# Patient Record
Sex: FEMALE | Race: WHITE
[De-identification: names, ages, dates, MRNs, and addresses within clinical notes are randomized per-mention and may not be internally consistent; named-entity substitution may affect disease eponyms.]

---

## 2018-03-29 ENCOUNTER — HOSPITAL ENCOUNTER (OUTPATIENT)
Dept: HOSPITAL 62 - WI | Age: 73
End: 2018-03-29
Attending: NURSE PRACTITIONER
Payer: MEDICARE

## 2018-03-29 DIAGNOSIS — Z12.31: Primary | ICD-10-CM

## 2018-03-29 PROCEDURE — 77063 BREAST TOMOSYNTHESIS BI: CPT

## 2018-03-29 PROCEDURE — 77067 SCR MAMMO BI INCL CAD: CPT

## 2018-04-03 NOTE — WOMENS IMAGING REPORT
EXAM DESCRIPTION:  3D SCREENING MAMMO BILAT



COMPLETED DATE/TIME:  3/29/2018 1:54 pm



REASON FOR STUDY:  ENCNTR SCREEN MAMMOGRAM FOR MALIGNANT NEOPLASM OF BREAST Z12.31  ENCNTR SCREEN AMPARO
MOGRAM FOR MALIGNANT NEOPLASM OF LEBRON



COMPARISON:  8/9/2016



TECHNIQUE:  Standard craniocaudal and mediolateral oblique views of each breast recorded using digita
l acquisition and breast tomosynthesis.



LIMITATIONS:  None.



FINDINGS:  Findings present which are benign by mammographic criteria.  No suspicious masses, calcifi
cations or architectural distortion.

Pertinent benign findings: Stable benign bilateral breast parenchymal calcifications and small parenc
hymal nodules.

Read with the assistance of CAD.

.[facility] - R2 Cenova Version 1.3

.[facility] - R2 Cenova Version 1.3

.[facility] - R2 Cenova Version 2.4

.[facility] [place] [facility] - R2 Cenova Version 2.4

.[facility] - R2  Version 9.2

Benign mammographic findings may include one or more of the following:  Smooth masses, popcorn/rim/co
arse calcifications, asymmetries, post-procedure changes, and lesions with long-standing stability.



IMPRESSION:  BENIGN MAMMOGRAPHIC FINDINGS.  BIRADS 2



BREAST DENSITY:  b. There are scattered areas of fibroglandular density.



BIRAD:  2 BENIGN FINDING(S)



RECOMMENDATION:  RECOMMENDATION: ROUTINE SCREENING

Please continue yearly bilateral screening tomosynthesis in April 2019



COMMENT:  The patient has been notified of the results by letter per SA requirements. Additional no
tification policies are in place for contacting patient with suspicious or incomplete findings.

Quality ID #[id number]: The American College of Radiology recommends an annual screening mammogram for women
 aged 40 years or over. This facility utilizes a reminder system to ensure that all patients receive 
reminder letters, and/or direct phone calls for appointments. This includes reminders for routine scr
eening mammograms, diagnostic mammograms, or other Breast Imaging Interventions when appropriate.  Th
is patient will be placed in the appropriate reminder system.

The American College of Radiology (ACR) has developed recommendations for screening MRI of the breast
s in certain patient populations, to be used in conjunction with mammography.  Breast MRI surveillanc
e may be appropriate for women with more than 20% lifetime risk of developing breast cancer  as deter
mined by genetic testing, significant family history of the disease, or history of mantle radiation f
or Hodgkins Disease.  ACR Practice Guidelines 2008.

[employer]



PQRS 6045F: Fluoroscopic imaging is not utilized for breast tomosynthesis.



TECHNICAL DOCUMENTATION:  FINDING NUMBER: (1)

ASSESSMENT: (1)

JOB ID:  [id number]

 2011 [employer]- All Rights Reserved



Reading location - IP/workstation name: [email or web address]

## 2018-12-15 ENCOUNTER — HOSPITAL ENCOUNTER (EMERGENCY)
Dept: HOSPITAL 62 - ER | Age: 73
Discharge: HOME | End: 2018-12-15
Payer: MEDICARE

## 2018-12-15 VITALS — SYSTOLIC BLOOD PRESSURE: 146 MMHG | DIASTOLIC BLOOD PRESSURE: 96 MMHG

## 2018-12-15 DIAGNOSIS — M54.9: ICD-10-CM

## 2018-12-15 DIAGNOSIS — F17.200: ICD-10-CM

## 2018-12-15 DIAGNOSIS — N30.00: ICD-10-CM

## 2018-12-15 DIAGNOSIS — B02.9: Primary | ICD-10-CM

## 2018-12-15 DIAGNOSIS — I25.2: ICD-10-CM

## 2018-12-15 DIAGNOSIS — R10.9: ICD-10-CM

## 2018-12-15 LAB
APPEARANCE UR: (no result)
APTT PPP: YELLOW S
BILIRUB UR QL STRIP: NEGATIVE
GLUCOSE UR STRIP-MCNC: NEGATIVE MG/DL
KETONES UR STRIP-MCNC: NEGATIVE MG/DL
NITRITE UR QL STRIP: NEGATIVE
PH UR STRIP: 6 [PH] (ref 5–9)
PROT UR STRIP-MCNC: NEGATIVE MG/DL
SP GR UR STRIP: 1.02
UROBILINOGEN UR-MCNC: 2 MG/DL (ref ?–2)

## 2018-12-15 PROCEDURE — 76380 CAT SCAN FOLLOW-UP STUDY: CPT

## 2018-12-15 PROCEDURE — 81001 URINALYSIS AUTO W/SCOPE: CPT

## 2018-12-15 PROCEDURE — 99284 EMERGENCY DEPT VISIT MOD MDM: CPT

## 2018-12-15 NOTE — ER DOCUMENT REPORT
ED Medical Screen (RME)





- General


Chief Complaint: Flank Pain


Stated Complaint: BACK PAIN


Time Seen by Provider: 12/15/18 09:43


Mode of Arrival: Ambulatory


Information source: Patient


TRAVEL OUTSIDE OF THE [place] IN LAST 30 DAYS: No





- HPI


Patient complains to provider of: R flank/back pain


Onset: Other - Pt. with 4 day h/o R flank/lbp with exacerbation this am





- Related Data


Allergies/Adverse Reactions: 


 





erythromycin base Allergy (Verified 12/15/18 09:11)


 STOMACH PAIN


Sulfa (Sulfonamide Antibiotics) Allergy (Verified 12/15/18 09:11)


 











Past Medical History





- Past Medical History


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 

STRESS PER PT


   Denies: Hx Coronary Artery Disease, Hx Hypertension


Pulmonary Medical History: Reports: Hx Pneumonia - YRS AGO


   Denies: Hx Asthma, Hx Bronchitis, Hx COPD


Neurological Medical History: Denies: Hx Cerebrovascular Accident, Hx Seizures


Musculoskeltal Medical History: Denies Hx Arthritis





- Immunizations


Hx Diphtheria, Pertussis, Tetanus Vaccination: Yes





Physical Exam





- Vital signs


Vitals: 





 











Temp Pulse Resp BP Pulse Ox


 


 98.7 F   83   18   135/79 H  99 


 


 12/15/18 09:19  12/15/18 09:19  12/15/18 09:19  12/15/18 09:19  12/15/18 09:19














Course





- Vital Signs


Vital signs: 





 











Temp Pulse Resp BP Pulse Ox


 


 98.7 F   83   18   135/79 H  99 


 


 12/15/18 09:19  12/15/18 09:19  12/15/18 09:19  12/15/18 09:19  12/15/18 09:19














Doctor's Discharge





- Discharge


Referrals: 


JOSE ROWLAND NP [Primary Care Provider] - Follow up as needed

## 2018-12-15 NOTE — ER DOCUMENT REPORT
ED General





- General


Chief Complaint: Flank Pain


Stated Complaint: BACK PAIN


Time Seen by Provider: 12/15/18 09:43


Mode of Arrival: Ambulatory


Notes: 





73-year-old female patient emergency department chief complaint of right flank 

pain.  Patient states pain is been present for about 3 days.  Radiating from 

the back around to the front.  No fever or chills.  Generally does not feel 

well though.  Noticed this morning that she had a rash on the back and then on 

the right front chest but thought it was related to the heating pad she has 

been wearing.  Has not put any lotions or creams on her back or chest.


TRAVEL OUTSIDE OF THE [place] IN LAST 30 DAYS: No





- HPI


Onset: Other - 3 days ago


Onset/Duration: Gradual, Constant, Worse


Quality of pain: Achy


Severity: Moderate


Pain Level: 3


Associated symptoms: None





- Related Data


Allergies/Adverse Reactions: 


 





erythromycin base Allergy (Verified 12/15/18 09:11)


 STOMACH PAIN


Sulfa (Sulfonamide Antibiotics) Allergy (Verified 12/15/18 09:11)


 











Past Medical History





- General


Information source: Patient





- Social History


Smoking Status: Current Every Day Smoker


Chew tobacco use (# tins/day): No


Frequency of alcohol use: None


Drug Abuse: None


Family History: Reviewed & Not Pertinent


Patient has suicidal ideation: No


Patient has homicidal ideation: No





- Past Medical History


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 

STRESS PER PT


   Denies: Hx Coronary Artery Disease, Hx Hypertension


Pulmonary Medical History: Reports: Hx Pneumonia - YRS AGO


   Denies: Hx Asthma, Hx Bronchitis, Hx COPD


Neurological Medical History: Denies: Hx Cerebrovascular Accident, Hx Seizures


Renal/ Medical History: Denies: Hx Peritoneal Dialysis


Musculoskeletal Medical History: Denies Hx Arthritis





- Immunizations


Hx Diphtheria, Pertussis, Tetanus Vaccination: Yes


Hx Pneumococcal Vaccination: 01/01/17





Review of Systems





- Review of Systems


Notes: 





Constitutional: denies: Chills, Diaphoresis, Fever, Malaise, Weakness





EENT: denies: Eye discharge, Blurred vision, Tearing, Double vision, Nose 

congestion, Nose discharge, Throat swelling, Mouth pain





Cardiovascular: denies: Palpitations, Heart racing, Orthopnea, Dyspnea, Chest 

pain





Respiratory: denies: Cough, Hurts to breathe, Wheezing, Shortness of breath





Gastrointestinal: denies: Abdominal pain, Diarrhea, Nausea, Vomiting, Black 

stools, bright red blood in stool





Genitourinary: denies: Burning, Dysuria, Discharge, Frequency, Flank pain, 

Hematuria





Musculoskeletal:  denies: Joint pain, Joint swelling, Muscle pain, Muscle 

stiffness,.  Positive for right-sided back pain





Hematologic/Lymphatic:  denies: Anemia, Easy bleeding, Easy bruising, Blood 

clots





Neurological/Psychological: denies: Confusion, Dementia, Depression, Loss of 

consciousness





Skin: No lesions, no masses, no skin breakdown, no abscesses





Physical Exam





- Vital signs


Vitals: 


 











Temp Pulse Resp BP Pulse Ox


 


 98.7 F   83   18   135/79 H  99 


 


 12/15/18 09:19  12/15/18 09:19  12/15/18 09:19  12/15/18 09:19  12/15/18 09:19











Interpretation: Normal





- General


General appearance: Appears well, Alert





- HEENT


Head: Normocephalic, Atraumatic


Eyes: Normal


Pupils: PERRL





- Respiratory


Respiratory status: No respiratory distress


Chest status: Nontender


Breath sounds: Normal


Chest palpation: Normal





- Cardiovascular


Rhythm: Regular


Heart sounds: Normal auscultation


Murmur: No





- Abdominal


Inspection: Normal


Distension: No distension


Bowel sounds: Normal


Tenderness: Nontender


Organomegaly: No organomegaly





- Back


Back: Normal, Nontender





- Extremities


General upper extremity: Normal inspection, Nontender, Normal color, Normal ROM

, Normal temperature


General lower extremity: Normal inspection, Nontender, Normal color, Normal ROM

, Normal temperature, Normal weight bearing.  No: Austin's sign





- Neurological


Neuro grossly intact: Yes


Cognition: Normal


Orientation: AAOx4


Horacio Coma Scale Eye Opening: Spontaneous


[place] Coma Scale Verbal: Oriented


Horacio Coma Scale Motor: Obeys Commands


Horacio Coma Scale Total: 15


Speech: Normal


Motor strength normal: LUE, RUE, LLE, RLE


Sensory: Normal





- Psychological


Associated symptoms: Normal affect, Normal mood





- Skin


Skin Temperature: Warm


Skin Moisture: Dry


Skin Color: Other - There are multiple areas that appear to be red and acute 

early vesicular lesions consistent with what you would see with shingles.





Course





- Re-evaluation


Re-evalutation: 





12/15/18 11:44


CT scan was ordered from the front and performed prior to me evaluating her.  

My evaluation is consistent with shingles.  Will check her urine as well.  I 

have given her pain medication.  Likely will start her on some antivirals.


12/15/18 11:45





 





Limited or Localized CT  12/15/18 09:43


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  No urinary 

tract stones identified.


 











12/15/18 12:40





Laboratory











  12/15/18





  10:50


 


Urine Color  YELLOW


 


Urine Appearance  SLIGHTLY-CLOUDY


 


Urine pH  6.0


 


Ur Specific Gravity  1.017


 


Urine Protein  NEGATIVE


 


Urine Glucose (UA)  NEGATIVE


 


Urine Ketones  NEGATIVE


 


Urine Blood  NEGATIVE


 


Urine Nitrite  NEGATIVE


 


Urine Bilirubin  NEGATIVE


 


Urine Urobilinogen  2.0 H


 


Ur Leukocyte Esterase  LARGE H


 


Urine WBC (Auto)  17


 


Urine RBC (Auto)  3


 


Squamous Epi Cells Auto  1


 


Urine Mucus (Auto)  MANY


 


Urine Ascorbic Acid  NEGATIVE














- Vital Signs


Vital signs: 


 











Temp Pulse Resp BP Pulse Ox


 


 98.7 F   83   18   135/79 H  99 


 


 12/15/18 09:19  12/15/18 09:19  12/15/18 09:19  12/15/18 09:19  12/15/18 09:19














- Laboratory


Laboratory results interpreted by me: 


 











  12/15/18





  10:50


 


Urine Urobilinogen  2.0 H


 


Ur Leukocyte Esterase  LARGE H














Discharge





- Discharge


Clinical Impression: 


Shingles rash


Qualifiers:


 Herpes zoster complications: without complications Qualified Code(s): B02.9 - 

Zoster without complications





Urinary tract infection


Qualifiers:


 Urinary tract infection type: acute cystitis Hematuria presence: without 

hematuria Qualified Code(s): N30.00 - Acute cystitis without hematuria





Condition: Good


Disposition: HOME, SELF-CARE


Instructions:  Shingles ([facility]), Urinary Tract Infection ([facility])


Prescriptions: 


Hydrocodone/Acetaminophen [Norco 5-325 mg Tablet] 1 tab PO TID 5 Days #15 tablet


Nitrofurantoin/Nitrofuran Mac [Macrobid 100 mg Capsule] 1 tab PO BID #20 capsule


Valacyclovir HCl [Valtrex] 1,000 mg PO Q8H 7 Days #21 tablet


Referrals: 


JOSE ROWLAND NP [Primary Care Provider] - Follow up in 3-5 days

## 2018-12-15 NOTE — RADIOLOGY REPORT (SQ)
EXAM DESCRIPTION:  CT LTD RENAL STONE PROTOCOL ON



COMPLETED DATE/TIME:  12/15/2018 10:44 am



REASON FOR STUDY:  R flank pain



COMPARISON:  None.



TECHNIQUE:  CT scan of the abdomen and pelvis performed without intravenous or oral contrast. Images 
reviewed with lung, soft tissue, and bone windows. Reconstructed coronal and sagittal MPR images revi
ewed. All images stored on PACS.

All CT scanners at this facility use dose modulation, iterative reconstruction, and/or weight based d
osing when appropriate to reduce radiation dose to as low as reasonably achievable (ALARA).

[facility]: Dose Right  [facility]: CareDose    [facility]: Dose Right    [facility]: Teradose 4D    [facility]: Smart ISORG



RADIATION DOSE:  CT Rad equipment meets quality standard of care and radiation dose reduction techniq
ues were employed. CTDIvol: 4.9 mGy. DLP: 271 mGy-cm.mGy.



LIMITATIONS:  None.



FINDINGS:  LOWER CHEST: No significant findings. No nodules or infiltrates.

NON-CONTRASTED LIVER, SPLEEN, ADRENALS: Evaluation limited by lack of IV contrast. No identified sign
ificant masses.

PANCREAS: No masses. No peripancreatic inflammatory changes.

GALLBLADDER: No identified stones by CT criteria. No inflammatory changes to suggest cholecystitis.

RIGHT KIDNEY AND URETER: No suspicious masses. Assessment limited by lack of IV contrast.   No signif
icant calcifications.   Mild dilatation of the UPJ which is symmetric with the left.

LEFT KIDNEY AND URETER: No suspicious masses. Assessment limited by lack of IV contrast.   No signifi
cant calcifications.   Mild dilatation of the UPJ.

AORTA AND RETROPERITONEUM: No aneurysm. No retroperitoneal masses or adenopathy.

BOWEL AND PERITONEAL CAVITY: No obvious masses or inflammatory changes. No free fluid.

APPENDIX: Surgically absent.

PELVIS, BLADDER, AND ABDOMINAL WALL:No abnormal masses. No free fluid. Bladder normal.

BONES: No significant findings.

OTHER: No other significant finding.



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  No urinary tract stones identified.




COMMENT:  Quality ID # [id number]: Final reports with documentation of one or more dose reduction techniques
 (e.g., Automated exposure control, adjustment of the mA and/or kV according to patient size, use of 
iterative reconstruction technique)



TECHNICAL DOCUMENTATION:  JOB ID:  [id number]

 2011 [employer]- All Rights Reserved



Reading location - [email or web address]

## 2019-12-29 ENCOUNTER — HOSPITAL ENCOUNTER (INPATIENT)
Dept: HOSPITAL 62 - ER | Age: 74
LOS: 4 days | Discharge: NURSING, REHAB OR LONG-TERM CARE | DRG: 470 | End: 2020-01-02
Payer: MEDICARE
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
## 2019-12-29 DIAGNOSIS — F17.210: ICD-10-CM

## 2019-12-29 DIAGNOSIS — S72.142A: Primary | ICD-10-CM

## 2019-12-29 DIAGNOSIS — Z88.2: ICD-10-CM

## 2019-12-29 DIAGNOSIS — Z88.1: ICD-10-CM

## 2019-12-29 DIAGNOSIS — S72.012A: ICD-10-CM

## 2019-12-29 DIAGNOSIS — S42.252A: ICD-10-CM
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
## 2019-12-29 DIAGNOSIS — E78.5: ICD-10-CM

## 2019-12-29 DIAGNOSIS — I25.2: ICD-10-CM
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
## 2019-12-29 DIAGNOSIS — F10.129: ICD-10-CM

## 2019-12-29 PROCEDURE — 93010 ELECTROCARDIOGRAM REPORT: CPT

## 2019-12-29 PROCEDURE — 01210 ANES OPEN PX HIP JOINT NOS: CPT

## 2019-12-29 PROCEDURE — 86900 BLOOD TYPING SEROLOGIC ABO: CPT

## 2019-12-29 PROCEDURE — 36415 COLL VENOUS BLD VENIPUNCTURE: CPT

## 2019-12-29 PROCEDURE — 71045 X-RAY EXAM CHEST 1 VIEW: CPT

## 2019-12-29 PROCEDURE — 81001 URINALYSIS AUTO W/SCOPE: CPT

## 2019-12-29 PROCEDURE — 94667 MNPJ CHEST WALL 1ST: CPT

## 2019-12-29 PROCEDURE — 85730 THROMBOPLASTIN TIME PARTIAL: CPT

## 2019-12-29 PROCEDURE — 96374 THER/PROPH/DIAG INJ IV PUSH: CPT

## 2019-12-29 PROCEDURE — 82550 ASSAY OF CK (CPK): CPT

## 2019-12-29 PROCEDURE — 85025 COMPLETE CBC W/AUTO DIFF WBC: CPT

## 2019-12-29 PROCEDURE — C1887 CATHETER, GUIDING: HCPCS

## 2019-12-29 PROCEDURE — 72170 X-RAY EXAM OF PELVIS: CPT

## 2019-12-29 PROCEDURE — 51702 INSERT TEMP BLADDER CATH: CPT

## 2019-12-29 PROCEDURE — 85610 PROTHROMBIN TIME: CPT

## 2019-12-29 PROCEDURE — 94799 UNLISTED PULMONARY SVC/PX: CPT

## 2019-12-29 PROCEDURE — C1776 JOINT DEVICE (IMPLANTABLE): HCPCS

## 2019-12-29 PROCEDURE — 86850 RBC ANTIBODY SCREEN: CPT

## 2019-12-29 PROCEDURE — 85027 COMPLETE CBC AUTOMATED: CPT

## 2019-12-29 PROCEDURE — L3650 SO 8 ABD RESTRAINT PRE OTS: HCPCS

## 2019-12-29 PROCEDURE — 99285 EMERGENCY DEPT VISIT HI MDM: CPT

## 2019-12-29 PROCEDURE — 84484 ASSAY OF TROPONIN QUANT: CPT

## 2019-12-29 PROCEDURE — 80307 DRUG TEST PRSMV CHEM ANLYZR: CPT

## 2019-12-29 PROCEDURE — S0119 ONDANSETRON 4 MG: HCPCS

## 2019-12-29 PROCEDURE — 80053 COMPREHEN METABOLIC PANEL: CPT

## 2019-12-29 PROCEDURE — 96375 TX/PRO/DX INJ NEW DRUG ADDON: CPT

## 2019-12-29 PROCEDURE — 86901 BLOOD TYPING SEROLOGIC RH(D): CPT

## 2019-12-29 PROCEDURE — 93005 ELECTROCARDIOGRAM TRACING: CPT

## 2019-12-29 NOTE — RADIOLOGY REPORT (SQ)
EXAM DESCRIPTION: 



XR SHOULDER 2 OR MORE VIEWS



COMPLETED DATE/TME:  12/29/2019 21:23



CLINICAL HISTORY: 



74 years, Female, fall with injury and pain 



COMPARISON:

None.



NUMBER OF VIEWS:

1



TECHNIQUE:

Single view left shoulder



LIMITATIONS:

None.



FINDINGS:



Inferior dislocation of the humeral head relative to the bony

glenoid. Osteopenia. Displaced fracture deformity of the greater

tuberosity of the humerus.



IMPRESSION:



Inferior dislocation of the humerus with displaced fracture of

the greater tuberosity

 



copyright 2011 [employer]- All Rights Reserved

## 2019-12-29 NOTE — RADIOLOGY REPORT (SQ)
EXAM DESCRIPTION: 



XR HIP 2 OR MORE VIEWS



COMPLETED DATE/TME:  12/29/2019 21:23



CLINICAL HISTORY: 



74 years, Female, fall with injury and pain 



COMPARISON:

None.



NUMBER OF VIEWS:

2



TECHNIQUE:

AP pelvis single view left hip



LIMITATIONS:

None.



FINDINGS:



Osteopenia. Left subcapital hip fracture with slight varus

angulation. No dislocation. Rotation of the greater trochanter.



IMPRESSION:



Left subcapital hip fracture as above

 



copyright 2011 [employer]- All Rights Reserved

## 2019-12-29 NOTE — ER DOCUMENT REPORT
ED Fall





- General


Chief Complaint: Fall Injury


Stated Complaint: FALL


Time Seen by Provider: 12/29/19 22:50


Mode of Arrival: Medic


TRAVEL OUTSIDE OF THE [place] IN LAST 30 DAYS: No





- HPI


Occurred: Just prior to arrival


Where: Indoors


Context: Tripped


Location of injury/pain: Hip - Patient reports she was at a friend's house and 

actually is unsure what caused her to fall other than she believes there may hav

e been something she tripped on in the house.  There was no loss of 

consciousness head or neck injury.  Denies any back pain patient reports she has

pain in her left.  Shoulder and also on her left hip., Shoulder


Quality of pain: Achy


Severity: Severe


Pain Level: 5


Prehospital interventions: Other - There are no open wounds.  Patient has 

greatest tenderness noted in left shoulder and is unable to have range of motion

of her left shoulder.  Also unable to stand and walk.





- Related data


Allergies/Adverse Reactions: 


                                        





erythromycin base Allergy (Verified 12/15/18 09:11)


   STOMACH PAIN


Sulfa (Sulfonamide Antibiotics) Allergy (Verified 12/15/18 09:11)


   











Past Medical History





- Social History


Smoking Status: Current Every Day Smoker


Lives with: Alone


Family History: Reviewed & Not Pertinent


Patient has suicidal ideation: No


Patient has homicidal ideation: No





- Past Medical History


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 

STRESS PER PT


   Denies: Hx Coronary Artery Disease, Hx Hypertension


Pulmonary Medical History: Reports: Hx Pneumonia - YRS AGO


   Denies: Hx Asthma, Hx Bronchitis, Hx COPD


Neurological Medical History: Denies: Hx Cerebrovascular Accident, Hx Seizures


Renal/ Medical History: Denies: Hx Peritoneal Dialysis


Musculoskeletal Medical History: Denies Hx Arthritis





- Immunizations


Hx Diphtheria, Pertussis, Tetanus Vaccination: Yes


Hx Pneumococcal Vaccination: 01/01/17





Review of Systems





- Review of Systems


Constitutional: No symptoms reported


EENT: No symptoms reported, See HPI


Cardiovascular: No symptoms reported


Respiratory: No symptoms reported


Gastrointestinal: No symptoms reported


Genitourinary: No symptoms reported


Female Genitourinary: No symptoms reported


Musculoskeletal: See HPI


Skin: No symptoms reported


Hematologic/Lymphatic: No symptoms reported


Neurological/Psychological: No symptoms reported





Physical Exam





- Vital signs


Vitals: 


                                        











Pulse Ox


 


 97 


 


 12/29/19 21:27











Interpretation: Normal





- General


General appearance: Appears well, Alert





- HEENT


Head: Normocephalic, Atraumatic


Eyes: Normal


Pupils: PERRL





- Respiratory


Respiratory status: No respiratory distress


Chest status: Nontender


Breath sounds: Normal


Chest palpation: Normal





- Cardiovascular


Rhythm: Regular


Heart sounds: Normal auscultation


Murmur: No





- Abdominal


Inspection: Normal


Distension: No distension


Bowel sounds: Normal


Tenderness: Nontender


Organomegaly: No organomegaly





- Back


Back: Normal, Nontender





- Extremities


General upper extremity: Normal inspection, Nontender, Normal color, Normal ROM,

Normal temperature


General lower extremity: Normal inspection, Nontender, Normal color, Normal ROM,

Normal temperature, Normal weight bearing, Other - Left hip deformed and 

moderately tender.  No skin break noted..  No: Austin's sign


Shoulder: Deformity, Limited ROM, Other - Left shoulder with obvious deformity 

and decreased range of motion.  Severely tender to palpation and movement.





- Neurological


Neuro grossly intact: Yes


Cognition: Normal


Orientation: AAOx4


Horacio Coma Scale Eye Opening: Spontaneous


Horacio Coma Scale Verbal: Oriented


[place] Coma Scale Motor: Obeys Commands


Horacio Coma Scale Total: 15


Speech: Normal


Motor strength normal: LUE, RUE, LLE, RLE


Sensory: Normal





- Psychological


Associated symptoms: Normal affect, Normal mood





- Skin


Skin Temperature: Warm


Skin Moisture: Dry


Skin Color: Normal





Course





- Re-evaluation


Re-evalutation: 





12/30/19 01:56


Successful closed reduction of left inferior dislocated shoulder.  Post x-rays 

showed humerus and glenoid fossa correctly.  Patient tolerated procedure well 

with no complications.





- Vital Signs


Vital signs: 


                                        











Temp Pulse Resp BP Pulse Ox


 


 98.3 F   85   18   109/81   95 


 


 12/30/19 02:47  12/30/19 02:47  12/30/19 02:47  12/30/19 02:47  12/30/19 02:47














- Laboratory


Result Diagrams: 


                                 12/29/19 23:56





                                 12/29/19 23:56


Laboratory results interpreted by me: 


                                        











  12/29/19 12/29/19 12/30/19





  23:56 23:56 00:12


 


WBC  11.1 H  


 


MCV  99 H  


 


Absolute Neuts (auto)  8.9 H  


 


Seg Neutrophils %  80.6 H  


 


Carbon Dioxide   21 L 


 


Glucose   116 H 


 


Creatine Kinase   220 H 


 


Urine Glucose (UA)    50 H


 


Urine Blood    SMALL H


 


Ur Leukocyte Esterase    MODERATE H














- Diagnostic Test


Radiology reviewed: Image reviewed, Reports reviewed





Procedures





- Conscious Sedation


  ** Conscious sedation


Consent obtained: Yes


Prior complications: Procedural sedation


Normal healthy pt.: P1. - ASA Classification


Airway Evaluation: Normal anatomy, Other - Edentulous


Mallampati Classification: Class 1


Used during procedure: IV access obtained, Pulse ox on pt.


Medications administered: Ketamine


Reversal agents: None


I personally performed/intraservice time: Sedation, 30 min or less


Complications: No


Notes: 





Under conscious sedation using IV ketamine patient had a close reduction of her 

left shoulder dislocation and fracture.  Patient tolerated procedure well and 

had no complications hemodynamically or respiratory wise.  Post procedure x-ray 

showed normal alignment and reduction of the dislocation of the left shoulder.  

Also of note was an improvement in the proximity of the fracture of the lateral 

greater tuberosity fracture..





Discharge





- Discharge


Clinical Impression: 


 Hip fracture, left, Shoulder fracture, left, Dislocated shoulder





Disposition: ADMITTED AS INPATIENT


Admitting Provider: abraham


Unit Admitted: Surgical Floor

## 2019-12-30 LAB
ADD MANUAL DIFF: NO
ALBUMIN SERPL-MCNC: 4.2 G/DL (ref 3.5–5)
ALP SERPL-CCNC: 85 U/L (ref 38–126)
ANION GAP SERPL CALC-SCNC: 15 MMOL/L (ref 5–19)
APPEARANCE UR: CLEAR
APTT BLD: 31.4 SEC (ref 23.5–35.8)
APTT PPP: (no result) S
AST SERPL-CCNC: 27 U/L (ref 14–36)
BARBITURATES UR QL SCN: NEGATIVE
BASOPHILS # BLD AUTO: 0.1 10^3/UL (ref 0–0.2)
BASOPHILS NFR BLD AUTO: 0.5 % (ref 0–2)
BILIRUB DIRECT SERPL-MCNC: 0.2 MG/DL (ref 0–0.4)
BILIRUB SERPL-MCNC: 0.2 MG/DL (ref 0.2–1.3)
BILIRUB UR QL STRIP: NEGATIVE
BUN SERPL-MCNC: 14 MG/DL (ref 7–20)
CALCIUM: 9.2 MG/DL (ref 8.4–10.2)
CHLORIDE SERPL-SCNC: 105 MMOL/L (ref 98–107)
CK SERPL-CCNC: 220 U/L (ref 30–135)
CO2 SERPL-SCNC: 21 MMOL/L (ref 22–30)
EOSINOPHIL # BLD AUTO: 0 10^3/UL (ref 0–0.6)
EOSINOPHIL NFR BLD AUTO: 0.2 % (ref 0–6)
ERYTHROCYTE [DISTWIDTH] IN BLOOD BY AUTOMATED COUNT: 13.9 % (ref 11.5–14)
ETHANOL SERPL-MCNC: 111 MG/DL
GLUCOSE SERPL-MCNC: 116 MG/DL (ref 75–110)
GLUCOSE UR STRIP-MCNC: 50 MG/DL
HCT VFR BLD CALC: 38.4 % (ref 36–47)
HGB BLD-MCNC: 12.9 G/DL (ref 12–15.5)
INR PPP: 0.97
KETONES UR STRIP-MCNC: NEGATIVE MG/DL
LYMPHOCYTES # BLD AUTO: 1.6 10^3/UL (ref 0.5–4.7)
LYMPHOCYTES NFR BLD AUTO: 14.2 % (ref 13–45)
MCH RBC QN AUTO: 33.4 PG (ref 27–33.4)
MCHC RBC AUTO-ENTMCNC: 33.7 G/DL (ref 32–36)
MCV RBC AUTO: 99 FL (ref 80–97)
METHADONE UR QL SCN: NEGATIVE
MONOCYTES # BLD AUTO: 0.5 10^3/UL (ref 0.1–1.4)
MONOCYTES NFR BLD AUTO: 4.5 % (ref 3–13)
NEUTROPHILS # BLD AUTO: 8.9 10^3/UL (ref 1.7–8.2)
NEUTS SEG NFR BLD AUTO: 80.6 % (ref 42–78)
NITRITE UR QL STRIP: NEGATIVE
PCP UR QL SCN: NEGATIVE
PH UR STRIP: 5 [PH] (ref 5–9)
PLATELET # BLD: 274 10^3/UL (ref 150–450)
POTASSIUM SERPL-SCNC: 4.1 MMOL/L (ref 3.6–5)
PROT SERPL-MCNC: 7 G/DL (ref 6.3–8.2)
PROT UR STRIP-MCNC: NEGATIVE MG/DL
PROTHROMBIN TIME: 12.9 SEC (ref 11.4–15.4)
RBC # BLD AUTO: 3.87 10^6/UL (ref 3.72–5.28)
SP GR UR STRIP: 1.01
TOTAL CELLS COUNTED % (AUTO): 100 %
URINE AMPHETAMINES SCREEN: NEGATIVE
URINE BENZODIAZEPINES SCREEN: NEGATIVE
URINE COCAINE SCREEN: NEGATIVE
URINE MARIJUANA (THC) SCREEN: NEGATIVE
UROBILINOGEN UR-MCNC: NEGATIVE MG/DL (ref ?–2)
WBC # BLD AUTO: 11.1 10^3/UL (ref 4–10.5)

## 2019-12-30 PROCEDURE — 0PSDXZZ REPOSITION LEFT HUMERAL HEAD, EXTERNAL APPROACH: ICD-10-PCS

## 2019-12-30 RX ADMIN — ATORVASTATIN CALCIUM SCH MG: 10 TABLET, FILM COATED ORAL at 23:35

## 2019-12-30 RX ADMIN — MORPHINE SULFATE PRN MG: 10 INJECTION INTRAMUSCULAR; INTRAVENOUS; SUBCUTANEOUS at 19:02

## 2019-12-30 RX ADMIN — OXYCODONE HYDROCHLORIDE PRN MG: 5 TABLET ORAL at 16:18

## 2019-12-30 RX ADMIN — ACETAMINOPHEN SCH MG: 325 TABLET ORAL at 05:09

## 2019-12-30 RX ADMIN — OXYCODONE HYDROCHLORIDE PRN MG: 5 TABLET ORAL at 20:53

## 2019-12-30 RX ADMIN — MORPHINE SULFATE PRN MG: 10 INJECTION INTRAMUSCULAR; INTRAVENOUS; SUBCUTANEOUS at 03:16

## 2019-12-30 RX ADMIN — ONDANSETRON PRN MG: 4 TABLET, ORALLY DISINTEGRATING ORAL at 03:47

## 2019-12-30 RX ADMIN — DOCUSATE SODIUM SCH MG: 100 CAPSULE, LIQUID FILLED ORAL at 10:08

## 2019-12-30 RX ADMIN — ACETAMINOPHEN SCH: 325 TABLET ORAL at 03:39

## 2019-12-30 RX ADMIN — ACETAMINOPHEN SCH MG: 325 TABLET ORAL at 23:35

## 2019-12-30 RX ADMIN — MORPHINE SULFATE PRN MG: 10 INJECTION INTRAMUSCULAR; INTRAVENOUS; SUBCUTANEOUS at 23:34

## 2019-12-30 RX ADMIN — PANTOPRAZOLE SODIUM SCH MG: 20 TABLET, DELAYED RELEASE ORAL at 05:10

## 2019-12-30 RX ADMIN — MORPHINE SULFATE PRN MG: 10 INJECTION INTRAMUSCULAR; INTRAVENOUS; SUBCUTANEOUS at 13:05

## 2019-12-30 RX ADMIN — ACETAMINOPHEN SCH MG: 325 TABLET ORAL at 19:01

## 2019-12-30 RX ADMIN — ONDANSETRON PRN MG: 4 TABLET, ORALLY DISINTEGRATING ORAL at 10:08

## 2019-12-30 RX ADMIN — ENOXAPARIN SODIUM SCH MG: 30 INJECTION SUBCUTANEOUS at 10:08

## 2019-12-30 RX ADMIN — OXYCODONE HYDROCHLORIDE PRN MG: 5 TABLET ORAL at 10:07

## 2019-12-30 RX ADMIN — ACETAMINOPHEN SCH MG: 325 TABLET ORAL at 13:06

## 2019-12-30 RX ADMIN — MORPHINE SULFATE PRN MG: 10 INJECTION INTRAMUSCULAR; INTRAVENOUS; SUBCUTANEOUS at 08:23

## 2019-12-30 NOTE — PDOC CONSULTATION
Consultation


Consult Date: 19


Attending physician:: ADELA GRIGSBY JR


Provider Consulted: EARLINE ARANA


Consult reason:: pre-op clearance





History of Present Illness


Admission Date/PCP: 


  19 00:54





  JOSE ROWLAND NP





Patient complains of: Left hip pain, left shoulder pain, status post fall at 

home


History of Present Illness: 


SOPHY SHAFER is a 74 year old female who was admitted to the orthopedic 

service with A left shoulder fracture with dislocation status post reduction in 

the emergency department and left hip fracture related to mechanical fall at 

home.  Dr. Grigsby is the attending; hospitalist service was consulted for pre

operative clearance and medical management.





Dr. Grigsby plans for left hip hemiarthroplasty 2019.





Patient was seen on morning rounds, she was found resting in bed comfortably on 

room air.  She does admit to smoking 1/2 pack daily.  Otherwise, she reports 

history of remote MI and hyperlipidemia only.  Her home medication regimen 

includes lisinopril 2.5 mg and atorvastatin 10 mg daily.





Patient reports that she is capable of walking up a flight of stairs without 

need for pause.  She ambulates independently in from parking lots and in the 

grocery store without use of assistive devices.  She also describes being 

independent of yard work.


Patient reports that she lives alone, independently, but does have numerous 

friends can provide assistance as needed.  She understands that she will likely 

d/c to SNF for short term rehab.





She reports her pain is adequately well controlled at this time; looking forward

to surgical repair tomorrow.


She has no other questions or concerns at this time.





Past Medical History


Cardiac Medical History: Reports: Myocardial Infarction - 2015 MINOR HEART 

ATTACK R/T STRESS PER PT


   Denies: Coronary Artery Disease, Hypertension


Pulmonary Medical History: Reports: Pneumonia - YRS AGO


   Denies: Asthma, Bronchitis, Chronic Obstructive Pulmonary Disease (COPD)


Neurological Medical History: Reports: None


   Denies: Seizures


Endocrine Medical History: Reports: None


Renal/ Medical History: Reports: None


Malignancy Medical History: Reports: None


GI Medical History: Reports: None


Musculoskeltal Medical History: 


   Denies: Arthritis


Skin Medical History: Reports: None


Psychiatric Medical History: Reports: None


Traumatic Medical History: Reports: None


Hematology: 


   Denies: Anemia


Infectious Medical History: Reports: None





Past Surgical History


Past Surgical History: Reports: None





Social History


Information Source: Patient


Lives with: Alone


Smoking Status: Current Every Day Smoker


Cigarettes Packs Per Day: 0.5


Number of Years Smokin


Frequency of Alcohol Use: Occasional


Hx Recreational Drug Use: No


Hx Prescription Drug Abuse: No





- Advance Directive


Resuscitation Status: Full Code





Family History


Family History: Reviewed & Not Pertinent


Parental Family History Reviewed: Yes


Children Family History Reviewed: Yes


Sibling(s) Family History Reviewed.: Yes





Medication/Allergy


Home Medications: 








Atorvastatin Calcium [Lipitor 10 mg Tablet] 10 mg PO ASDIR 18 


Ibuprofen 200 mg PO DAILYP PRN 19 


Lisinopril [Prinivil 2.5 mg Tablet] 2.5 mg PO DAILYP PRN 19 








Allergies/Adverse Reactions: 


                                        





erythromycin base Allergy (Verified 12/15/18 09:11)


   STOMACH PAIN


Sulfa (Sulfonamide Antibiotics) Allergy (Verified 12/15/18 09:11)


   











Review of Systems


Constitutional: ABSENT: chills, fever(s), headache(s), weight gain, weight loss


Eyes: ABSENT: visual disturbances


Ears: ABSENT: hearing changes


Cardiovascular: ABSENT: chest pain, dyspnea on exertion, edema, orthropnea, 

palpitations


Respiratory: ABSENT: cough, hemoptysis


Gastrointestinal: ABSENT: abdominal pain, constipation, diarrhea, hematemesis, 

hematochezia, nausea, vomiting


Genitourinary: ABSENT: dysuria, hematuria


Musculoskeletal: PRESENT: as per HPI, other - Left shoulder pain, left hip pain


Integumentary: ABSENT: rash, wounds


Neurological: ABSENT: abnormal gait, abnormal speech, confusion, dizziness, 

focal weakness, syncope


Psychiatric: ABSENT: anxiety, depression, homidical ideation, suicidal ideation


Endocrine: ABSENT: cold intolerance, heat intolerance, polydipsia, polyuria


Hematologic/Lymphatic: ABSENT: easy bleeding, easy bruising





Physical Exam


Vital Signs: 


                                        











Temp Pulse Resp BP Pulse Ox


 


 97.2 F   83   19   159/74 H  96 


 


 19 11:03  19 11:03  19 11:03  19 11:03  19 11:03








                                 Intake & Output











 19





 06:59 06:59 06:59


 


Output Total  550 


 


Balance  -550 


 


Weight  60.3 kg 











General appearance: PRESENT: no acute distress, cooperative, well-developed, 

well-nourished


Head exam: PRESENT: atraumatic, normocephalic


Eye exam: PRESENT: conjunctiva pink, EOMI, PERRLA.  ABSENT: scleral icterus


Ear exam: PRESENT: normal external ear exam


Mouth exam: PRESENT: moist, tongue midline


Neck exam: ABSENT: carotid bruit, JVD, lymphadenopathy, thyromegaly


Respiratory exam: PRESENT: clear to auscultation phoebe, symmetrical, unlabored.  

ABSENT: rales, rhonchi, wheezes


Cardiovascular exam: PRESENT: RRR, +S1, +S2.  ABSENT: diastolic murmur, rubs, 

systolic murmur


Pulses: PRESENT: normal dorsalis pedis pul


Vascular exam: PRESENT: normal capillary refill


GI/Abdominal exam: PRESENT: normal bowel sounds, soft.  ABSENT: distended, 

guarding, mass, organolmegaly, rebound, tenderness


Rectal exam: PRESENT: deferred


Extremities exam: PRESENT: other - LUE to sling, LLE ROM limited r/t pain.  

ABSENT: calf tenderness, clubbing, pedal edema


Neurological exam: PRESENT: alert, awake, oriented to person, oriented to place,

oriented to time, oriented to situation, CN II-XII grossly intact.  ABSENT: 

motor sensory deficit


Psychiatric exam: PRESENT: appropriate affect, normal mood.  ABSENT: homicidal 

ideation, suicidal ideation


Skin exam: PRESENT: dry, intact, warm.  ABSENT: cyanosis, rash





Results


Laboratory Results: 


                                        





                                 19 23:56 





                                 19 23:56 





                                        











  19





  23:56 23:56 23:56


 


WBC  11.1 H  


 


RBC  3.87  


 


Hgb  12.9  


 


Hct  38.4  


 


MCV  99 H  


 


MCH  33.4  


 


MCHC  33.7  


 


RDW  13.9  


 


Plt Count  274  


 


Seg Neutrophils %  80.6 H  


 


Sodium   140.5 


 


Potassium   4.1 


 


Chloride   105 


 


Carbon Dioxide   21 L 


 


Anion Gap   15 


 


BUN   14 


 


Creatinine   0.69 


 


Est GFR ( Amer)   > 60 


 


Glucose   116 H 


 


Calcium   9.2 


 


Total Bilirubin   0.2 


 


AST   27 


 


Alkaline Phosphatase   85 


 


Total Protein   7.0 


 


Albumin   4.2 


 


Urine Color   


 


Urine Appearance   


 


Urine pH   


 


Ur Specific Gravity   


 


Urine Protein   


 


Urine Glucose (UA)   


 


Urine Ketones   


 


Urine Blood   


 


Urine Nitrite   


 


Ur Leukocyte Esterase   


 


Urine WBC (Auto)   


 


Urine RBC (Auto)   


 


Blood Type    O POSITIVE


 


Antibody Screen    NEGATIVE














  19





  00:12


 


WBC 


 


RBC 


 


Hgb 


 


Hct 


 


MCV 


 


MCH 


 


MCHC 


 


RDW 


 


Plt Count 


 


Seg Neutrophils % 


 


Sodium 


 


Potassium 


 


Chloride 


 


Carbon Dioxide 


 


Anion Gap 


 


BUN 


 


Creatinine 


 


Est GFR (African Amer) 


 


Glucose 


 


Calcium 


 


Total Bilirubin 


 


AST 


 


Alkaline Phosphatase 


 


Total Protein 


 


Albumin 


 


Urine Color  STRAW


 


Urine Appearance  CLEAR


 


Urine pH  5.0


 


Ur Specific Gravity  1.006


 


Urine Protein  NEGATIVE


 


Urine Glucose (UA)  50 H


 


Urine Ketones  NEGATIVE


 


Urine Blood  SMALL H


 


Urine Nitrite  NEGATIVE


 


Ur Leukocyte Esterase  MODERATE H


 


Urine WBC (Auto)  6


 


Urine RBC (Auto)  7


 


Blood Type 


 


Antibody Screen 








                                        











  19





  23:56 23:56


 


Creatine Kinase  220 H 


 


Troponin I   < 0.012











Impressions: 


                                        





Hip X-Ray  19 21:23


IMPRESSION:


 


Left subcapital hip fracture as above


 


 


copyright  [employer]- All Rights Reserved


 








Chest X-Ray  19 23:13


IMPRESSION:


 


No acute cardiopulmonary process


 


 


copyright  [employer]- All Rights Reserved


 








Shoulder X-Ray  19 01:20


IMPRESSION:


 


1. Interval reduction of the left shoulder dislocation.


2. Mildly displaced fracture through the greater tuberosity.


3. Mild diffuse bone demineralization. 


 














Assessment and Plan





- Diagnosis


(1) Hip fracture, left


Qualifiers: 


   Encounter type: initial encounter   Fracture type: closed   Qualified 

Code(s): S72.002A - Fracture of unspecified part of neck of left femur, initial 

encounter for closed fracture   


Is this a current diagnosis for this admission?: Yes   


Plan: 


Primary management per orthopedic team.


N.p.o. after midnight for planned orthopedic repair tomorrow.


Analgesics as needed.





The patient is independent of ADLs, grocery shopping, and yard work.  She does 

not require pauses to catch her breath and denies chest pain/discomfort during 

physical activity.


Gottlieb perioperative risk score of 0%.


She is at risk for postoperative atelectasis related to her tobacco dependence. 

Recommend aggressive pulmonary toilet during the intraoperative period.


She was acutely intoxicated at time of ED arrival.  Denies HX of EtOH 

dependence/withdrawal.  Will monitor closely for evidence otherwise and 

intervene as indicated.





The patient is medically cleared for surgery as she does not have any 

immediately modifiable risk factors at this time.








(2) HLD (hyperlipidemia)


Is this a current diagnosis for this admission?: Yes   


Plan: 


Patient is placed on a regular diet.


Continue home dose statin therapy.








(3) Tobacco dependence


Is this a current diagnosis for this admission?: Yes   


Plan: 


Smoking cessation encouraged.


Nicotine replacement therapies provided.





Aggressive pulmonary toilet during the intraoperative period; IS and flutter 

valve to bedside.  As needed nebulizer treatments available.








(4) Shoulder fracture, left


Qualifiers: 


   Encounter type: initial encounter   Fracture type: closed   Qualified 

Code(s): S42.92XA - Fracture of left shoulder girdle, part unspecified, initial 

encounter for closed fracture   


Is this a current diagnosis for this admission?: Yes   


Plan: 


Primary management per orthopedic team.


Sling.


Analgesics as needed.








(5) Dislocated shoulder


Qualifiers: 


   Laterality: left 


Is this a current diagnosis for this admission?: Yes   


Plan: 


Status post reduction while in the emergency department.


Primary management per orthopedic team.


Analgesics as needed.








(6) Acute alcohol intoxication


Qualifiers: 


   Complication of substance-induced condition: uncomplicated   Qualified 

Code(s): F10.920 - Alcohol use, unspecified with intoxication, uncomplicated   


Is this a current diagnosis for this admission?: Yes   


Plan: 


At time of admission, serum alcohol level of 111.


Patient admits to occasional/social alcohol intake.  She denies alcohol 

dependence or history of withdrawal.





We will provide daily multivitamin, folic acid, and thiamine supplementation.


Monitor for evidence of alcohol dependence/withdrawal.








- Time


Time Spent with patient: 35 or more minutes


Medications reviewed and adjusted accordingly: Yes


Anticipated discharge: SNF


Within: within 72 hours

## 2019-12-30 NOTE — EKG REPORT
SEVERITY:- BORDERLINE ECG -

SINUS RHYTHM

DIFFUSE NONSPECIFIC ST-T CHANGES

:

Confirmed by: Virgil Miles MD 30-Dec-2019 08:03:00

## 2019-12-30 NOTE — RADIOLOGY REPORT (SQ)
EXAM DESCRIPTION: 



XR CHEST 1 VIEW



COMPLETED DATE/TME:  12/29/2019 23:13



CLINICAL HISTORY: 



74 years, Female, trauma



COMPARISON:

None.



NUMBER OF VIEWS:

1



TECHNIQUE:

Portable chest



LIMITATIONS:

None.



FINDINGS:



Heart size normal. Osteopenia. Lungs clear. No pneumothorax



IMPRESSION:



No acute cardiopulmonary process

 



copyright 2011 [employer]- All Rights Reserved

## 2019-12-30 NOTE — RADIOLOGY REPORT (SQ)
EXAM:



X-ray shoulder one view



CLINICAL DATA:



Status post reduction left shoulder



TECHNICAL DATA:



A single portable AP view of the left shoulder was performed on

12/30/2019 at 1:26 AM.



COMPARISONS: 12/29/2019 at 10:17 PM



FINDINGS:



When compared to the prior study, there has been interval

reduction of the left shoulder dislocation. Again demonstrated is

a mildly displaced fracture through the greater tuberosity. The

acromioclavicular joint is intact. No significant degenerative or

arthritic changes are identified. There are no pathologic lytic

or sclerotic bone lesions. 

 

Bone mineralization is slightly diminished.



No focal soft tissue abnormalities are identified.



IMPRESSION:



1. Interval reduction of the left shoulder dislocation.

2. Mildly displaced fracture through the greater tuberosity.

3. Mild diffuse bone demineralization.

## 2019-12-30 NOTE — PROGRESS NOTE
Provider Note


Provider Note: 


History and Physical


Patient Name: SOPHY SHAFER                Medical Record Number: [id number]


YOB: 1945                              Patient Status: Inpatient


Attending Provider: ADELA DUMONT JR              Account Number: [id number]


Date: 19 12:30                         Initialization Date: 19 12:30








 Orthopedic History and Physical 


Date: 19


Attending physician:: ADELA DUMONT JR


Provider Consulted: ADELA DUMONT JR


Consult reason:: History and Physical.





 History of Present Illness 


Admission Date/PCP: 


  19 00:54





  JOSE ROWLAND NP





Patient complains of: Left shoulder pain, left hip pain


History of Present Illness: 


SOPHY SHAFER is a 74 year old female who was brought to the emergency depar

tment after a fall at home.  She is unsure exactly how she fell but she tripped 

and landed on her left side, resulting in both severe left shoulder pain and 8 

out of 10 left hip pain leading to the inability to ambulate.  Pain is constant 

and worse with any motion.  Pain medication improves the pain.  She was brought 

to the hospital and x-rays were taken that demonstrate both a dislocated left 

shoulder as well as a left intertrochanteric hip fracture.  The emergency 

department reduced her shoulder and subsequent films show a concentrically 

reduced shoulder.  She denies prior loss of consciousness head injury or 

syncopal-like episode,  she denies current altered sensorium.








 Past Medical History 


Cardiac Medical History: Reports: Myocardial Infarction - 2015 [facility] HEART 

ATTACK R/T STRESS PER PT


   Denies: Coronary Artery Disease, Hypertension


Pulmonary Medical History: Reports: Pneumonia - YRS AGO


   Denies: Asthma, Bronchitis, Chronic Obstructive Pulmonary Disease (COPD)


Neurological Medical History: Reports: None


   Denies: Seizures


Endocrine Medical History: Reports: None


Renal/ Medical History: Reports: None


Malignancy Medical History: Reports: None


GI Medical History: Reports: None


Musculoskeltal Medical History: 


   Denies: Arthritis


Skin Medical History: Reports: None


Psychiatric Medical History: Reports: None


Traumatic Medical History: Reports: None


Hematology: 


   Denies: Anemia


Infectious Medical History: Reports: None





 Social History 


Lives with: Alone


Smoking Status: Current Every Day Smoker


Cigarettes Packs Per Day: 1


Number of Years Smokin


Frequency of Alcohol Use: Occasional


Hx Recreational Drug Use: No





- Advance Directive


Resuscitation Status: Full Code





 Family History 


Family History: Reviewed & Not Pertinent


Parental Family History Reviewed: No


Children Family History Reviewed: NA


Sibling(s) Family History Reviewed.: NA





 Medication/Allergy 


Home Medications: 








Atorvastatin Calcium [Lipitor 10 mg Tablet] 10 mg PO ASDIR 18 


Ibuprofen 200 mg PO DAILYP PRN 19 


Lisinopril [Prinivil 2.5 mg Tablet] 2.5 mg PO DAILYP PRN 19 








Allergies/Adverse Reactions: 


                                        





erythromycin base Allergy (Verified 12/15/18 09:11)


   STOMACH PAIN


Sulfa (Sulfonamide Antibiotics) Allergy (Verified 12/15/18 09:11)


   











 Review of Systems 


Review of Systems: 





Constitutional: ABSENT: anorexia, chills, night sweats


Cardiovascular: ABSENT: chest pain


Respiratory: ABSENT: dyspnea


Gastrointestinal: ABSENT: vomiting


Genitourinary: ABSENT: dysuria


Integumentary: ABSENT: rash


Neurological: ABSENT: confusion, memory loss, numbness


Psychiatric: ABSENT: hallucinations


Hematologic/Lymphatic: ABSENT: easy bleeding








 Physical Exam 


Vital Signs: 


                                        











Temp Pulse Resp BP Pulse Ox


 


 97.2 F   83   19   159/74 H  96 


 


 19 11:03  19 11:03  19 11:03  19 11:03  19 11:03








                                 Intake & Output











 19





 06:59 06:59 06:59


 


Output Total  550 


 


Balance  -550 


 


Weight  60.3 kg 











Physical Exam: 





General appearance: PRESENT: no acute distress, cooperative, well-nourished


Head exam: PRESENT: atraumatic, normocephalic


Eye exam: PRESENT: EOMI


Ear exam: PRESENT: normal external ear exam


Mouth exam: PRESENT: neck supple


Neck exam: ABSENT: tracheal deviation


Respiratory exam: PRESENT: symmetrical, unlabored.  ABSENT: accessory muscle 

use, wheezes


Pulses: PRESENT: normal radial pulses, normal dorsalis pedis pulse


Vascular exam: PRESENT: normal capillary refill


GI/Abdominal exam: ABSENT: distended, firm


Extremities exam: PRESENT: full ROM of bilateral shoulders, elbows wrists, 

knees, hips and ankles without pain


Musculoskeletal exam: PRESENT: full ROM, normal inspection of all 4 extremities 

aside from that noted below. 


Neurological exam: PRESENT: alert, awake, oriented to person, oriented to place,

oriented to time


Psychiatric exam: PRESENT: appropriate affect.  ABSENT: agitated


Focused psych exam: ABSENT: catatonic


Skin exam: PRESENT: intact.  ABSENT: dry





All as above aside from that noted in the HPI and the following:


LUE


Left upper extremity sensation grossly intact to radial median and ulnar nerve.


Left upper extremity motor function grossly intact to radian median ulnar nerve 

AIN and PIN


Pulses 2+, capillary refill less than 2 seconds


No deformity noted full range of motion of the elbow wrist and fingers without 

pain


Compartments soft, no tenderness to palpation 


skin intact


Left shoulder was in sling at time of exam, some tenderness to palpation about 

the shoulder.





Left lower extremity


-Pulses 2+ distally


-Compartments soft


-Sensation grossly intact to L3-4-5 S1


-Motor grossly intact to EHL TA gastroc and quad


-Pain with any range of motion of the left hip





 Results 


Laboratory Results: 


                                        





                                 19 23:56 





                                 19 23:56 





                                        











  19





  23:56 23:56 23:56


 


WBC  11.1 H  


 


RBC  3.87  


 


Hgb  12.9  


 


Hct  38.4  


 


MCV  99 H  


 


MCH  33.4  


 


MCHC  33.7  


 


RDW  13.9  


 


Plt Count  274  


 


Seg Neutrophils %  80.6 H  


 


Sodium   140.5 


 


Potassium   4.1 


 


Chloride   105 


 


Carbon Dioxide   21 L 


 


Anion Gap   15 


 


BUN   14 


 


Creatinine   0.69 


 


Est GFR ( Amer)   > 60 


 


Glucose   116 H 


 


Calcium   9.2 


 


Total Bilirubin   0.2 


 


AST   27 


 


Alkaline Phosphatase   85 


 


Total Protein   7.0 


 


Albumin   4.2 


 


Urine Color   


 


Urine Appearance   


 


Urine pH   


 


Ur Specific Gravity   


 


Urine Protein   


 


Urine Glucose (UA)   


 


Urine Ketones   


 


Urine Blood   


 


Urine Nitrite   


 


Ur Leukocyte Esterase   


 


Urine WBC (Auto)   


 


Urine RBC (Auto)   


 


Blood Type    O POSITIVE


 


Antibody Screen    NEGATIVE














  19





  00:12


 


WBC 


 


RBC 


 


Hgb 


 


Hct 


 


MCV 


 


MCH 


 


MCHC 


 


RDW 


 


Plt Count 


 


Seg Neutrophils % 


 


Sodium 


 


Potassium 


 


Chloride 


 


Carbon Dioxide 


 


Anion Gap 


 


BUN 


 


Creatinine 


 


Est GFR (African Amer) 


 


Glucose 


 


Calcium 


 


Total Bilirubin 


 


AST 


 


Alkaline Phosphatase 


 


Total Protein 


 


Albumin 


 


Urine Color  STRAW


 


Urine Appearance  CLEAR


 


Urine pH  5.0


 


Ur Specific Gravity  1.006


 


Urine Protein  NEGATIVE


 


Urine Glucose (UA)  50 H


 


Urine Ketones  NEGATIVE


 


Urine Blood  SMALL H


 


Urine Nitrite  NEGATIVE


 


Ur Leukocyte Esterase  MODERATE H


 


Urine WBC (Auto)  6


 


Urine RBC (Auto)  7


 


Blood Type 


 


Antibody Screen 








                                        











  19





  23:56 23:56


 


Creatine Kinase  220 H 


 


Troponin I   < 0.012











Impressions: 


                                        





Hip X-Ray  19 21:23


IMPRESSION:


 


Left subcapital hip fracture as above


 


 


copyright  [employer]- All Rights Reserved


 








Chest X-Ray  19 23:13


IMPRESSION:


 


No acute cardiopulmonary process


 


 


copyright  [employer]- All Rights Reserved


 








Shoulder X-Ray  19 01:20


IMPRESSION:


 


1. Interval reduction of the left shoulder dislocation.


2. Mildly displaced fracture through the greater tuberosity.


3. Mild diffuse bone demineralization. 


 














 Assessment & Plan 





- Diagnosis


(1) Hip fracture, left


Plan: 


At this time we will plan for left hip hemiarthroplasty to be performed on 

2019 after a medical evaluation and clearance.


-The patient is to be made n.p.o. at midnight tonight


-Bed rest


-Hold all chemical DVT prophylaxis at midnight tonight


-Ancef on-call for the OR


-Multimodal pain management








(2) Shoulder fracture, left


Plan: 


She has a left shoulder fracture dislocation with a greater tuberosity fracture.

 This appeared to reduce well with her shoulder reduction and may heal in 

position.  We will allow her to continue in the sling until further evaluation. 

I will get an axillary view to confirm reduction

## 2019-12-30 NOTE — PDOC CONSULTATION
Consultation


Consult Date: 19


Attending physician:: ADELA DUMONT JR


Provider Consulted: ADELA DUMONT JR


Consult reason:: History and Physical.





History of Present Illness


Admission Date/PCP: 


  19 00:54





  JOSE ROWLAND NP





Patient complains of: Left shoulder pain, left hip pain


History of Present Illness: 


SOPYH SHAFER is a 74 year old female who was brought to the emergency 

department after a fall at home.  She is unsure exactly how she fell but she 

tripped and landed on her left side, resulting in both severe left shoulder pain

and 8 out of 10 left hip pain leading to the inability to ambulate.  Pain is 

constant and worse with any motion.  Pain medication improves the pain.  She was

brought to the hospital and x-rays were taken that demonstrate both a dislocated

left shoulder as well as a left intertrochanteric hip fracture.  The emergency 

department reduced her shoulder and subsequent films show a concentrically redu

ernestina shoulder.  She denies prior loss of consciousness head injury or syncopal-

like episode,  she denies current altered sensorium.








Past Medical History


Cardiac Medical History: Reports: Myocardial Infarction - 2015 [facility] 

ATTACK R/T STRESS PER PT


   Denies: Coronary Artery Disease, Hypertension


Pulmonary Medical History: Reports: Pneumonia - YRS AGO


   Denies: Asthma, Bronchitis, Chronic Obstructive Pulmonary Disease (COPD)


Neurological Medical History: Reports: None


   Denies: Seizures


Endocrine Medical History: Reports: None


Renal/ Medical History: Reports: None


Malignancy Medical History: Reports: None


GI Medical History: Reports: None


Musculoskeltal Medical History: 


   Denies: Arthritis


Skin Medical History: Reports: None


Psychiatric Medical History: Reports: None


Traumatic Medical History: Reports: None


Hematology: 


   Denies: Anemia


Infectious Medical History: Reports: None





Social History


Lives with: Alone


Smoking Status: Current Every Day Smoker


Cigarettes Packs Per Day: 1


Number of Years Smokin


Frequency of Alcohol Use: Occasional


Hx Recreational Drug Use: No





- Advance Directive


Resuscitation Status: Full Code





Family History


Family History: Reviewed & Not Pertinent


Parental Family History Reviewed: No


Children Family History Reviewed: NA


Sibling(s) Family History Reviewed.: NA





Medication/Allergy


Home Medications: 








Atorvastatin Calcium [Lipitor 10 mg Tablet] 10 mg PO ASDIR 18 


Ibuprofen 200 mg PO DAILYP PRN 19 


Lisinopril [Prinivil 2.5 mg Tablet] 2.5 mg PO DAILYP PRN 19 








Allergies/Adverse Reactions: 


                                        





erythromycin base Allergy (Verified 12/15/18 09:11)


   STOMACH PAIN


Sulfa (Sulfonamide Antibiotics) Allergy (Verified 12/15/18 09:11)


   











Review of Systems


Review of Systems: 





Constitutional: ABSENT: anorexia, chills, night sweats


Cardiovascular: ABSENT: chest pain


Respiratory: ABSENT: dyspnea


Gastrointestinal: ABSENT: vomiting


Genitourinary: ABSENT: dysuria


Integumentary: ABSENT: rash


Neurological: ABSENT: confusion, memory loss, numbness


Psychiatric: ABSENT: hallucinations


Hematologic/Lymphatic: ABSENT: easy bleeding








Physical Exam


Vital Signs: 


                                        











Temp Pulse Resp BP Pulse Ox


 


 97.2 F   83   19   159/74 H  96 


 


 19 11:03  19 11:03  19 11:03  19 11:03  19 11:03








                                 Intake & Output











 19





 06:59 06:59 06:59


 


Output Total  550 


 


Balance  -550 


 


Weight  60.3 kg 











Physical Exam: 





General appearance: PRESENT: no acute distress, cooperative, well-nourished


Head exam: PRESENT: atraumatic, normocephalic


Eye exam: PRESENT: EOMI


Ear exam: PRESENT: normal external ear exam


Mouth exam: PRESENT: neck supple


Neck exam: ABSENT: tracheal deviation


Respiratory exam: PRESENT: symmetrical, unlabored.  ABSENT: accessory muscle 

use, wheezes


Pulses: PRESENT: normal radial pulses, normal dorsalis pedis pulse


Vascular exam: PRESENT: normal capillary refill


GI/Abdominal exam: ABSENT: distended, firm


Extremities exam: PRESENT: full ROM of bilateral shoulders, elbows wrists, 

knees, hips and ankles without pain


Musculoskeletal exam: PRESENT: full ROM, normal inspection of all 4 extremities 

aside from that noted below. 


Neurological exam: PRESENT: alert, awake, oriented to person, oriented to place,

oriented to time


Psychiatric exam: PRESENT: appropriate affect.  ABSENT: agitated


Focused psych exam: ABSENT: catatonic


Skin exam: PRESENT: intact.  ABSENT: dry





All as above aside from that noted in the HPI and the following:


LUE


Left upper extremity sensation grossly intact to radial median and ulnar nerve.


Left upper extremity motor function grossly intact to radian median ulnar nerve 

AIN and PIN


Pulses 2+, capillary refill less than 2 seconds


No deformity noted full range of motion of the elbow wrist and fingers without 

pain


Compartments soft, no tenderness to palpation 


skin intact


Left shoulder was in sling at time of exam, some tenderness to palpation about 

the shoulder.





Left lower extremity


-Pulses 2+ distally


-Compartments soft


-Sensation grossly intact to L3-4-5 S1


-Motor grossly intact to EHL TA gastroc and quad


-Pain with any range of motion of the left hip





Results


Laboratory Results: 


                                        





                                 19 23:56 





                                 19 23:56 





                                        











  19





  23:56 23:56 23:56


 


WBC  11.1 H  


 


RBC  3.87  


 


Hgb  12.9  


 


Hct  38.4  


 


MCV  99 H  


 


MCH  33.4  


 


MCHC  33.7  


 


RDW  13.9  


 


Plt Count  274  


 


Seg Neutrophils %  80.6 H  


 


Sodium   140.5 


 


Potassium   4.1 


 


Chloride   105 


 


Carbon Dioxide   21 L 


 


Anion Gap   15 


 


BUN   14 


 


Creatinine   0.69 


 


Est GFR ( Amer)   > 60 


 


Glucose   116 H 


 


Calcium   9.2 


 


Total Bilirubin   0.2 


 


AST   27 


 


Alkaline Phosphatase   85 


 


Total Protein   7.0 


 


Albumin   4.2 


 


Urine Color   


 


Urine Appearance   


 


Urine pH   


 


Ur Specific Gravity   


 


Urine Protein   


 


Urine Glucose (UA)   


 


Urine Ketones   


 


Urine Blood   


 


Urine Nitrite   


 


Ur Leukocyte Esterase   


 


Urine WBC (Auto)   


 


Urine RBC (Auto)   


 


Blood Type    O POSITIVE


 


Antibody Screen    NEGATIVE














  19





  00:12


 


WBC 


 


RBC 


 


Hgb 


 


Hct 


 


MCV 


 


MCH 


 


MCHC 


 


RDW 


 


Plt Count 


 


Seg Neutrophils % 


 


Sodium 


 


Potassium 


 


Chloride 


 


Carbon Dioxide 


 


Anion Gap 


 


BUN 


 


Creatinine 


 


Est GFR (African Amer) 


 


Glucose 


 


Calcium 


 


Total Bilirubin 


 


AST 


 


Alkaline Phosphatase 


 


Total Protein 


 


Albumin 


 


Urine Color  STRAW


 


Urine Appearance  CLEAR


 


Urine pH  5.0


 


Ur Specific Gravity  1.006


 


Urine Protein  NEGATIVE


 


Urine Glucose (UA)  50 H


 


Urine Ketones  NEGATIVE


 


Urine Blood  SMALL H


 


Urine Nitrite  NEGATIVE


 


Ur Leukocyte Esterase  MODERATE H


 


Urine WBC (Auto)  6


 


Urine RBC (Auto)  7


 


Blood Type 


 


Antibody Screen 








                                        











  19





  23:56 23:56


 


Creatine Kinase  220 H 


 


Troponin I   < 0.012











Impressions: 


                                        





Hip X-Ray  19 21:23


IMPRESSION:


 


Left subcapital hip fracture as above


 


 


copyright  [employer]- All Rights Reserved


 








Chest X-Ray  19 23:13


IMPRESSION:


 


No acute cardiopulmonary process


 


 


copyright  [employer]- All Rights Reserved


 








Shoulder X-Ray  19 01:20


IMPRESSION:


 


1. Interval reduction of the left shoulder dislocation.


2. Mildly displaced fracture through the greater tuberosity.


3. Mild diffuse bone demineralization. 


 














Assessment & Plan





- Diagnosis


(1) Hip fracture, left


Plan: 


At this time we will plan for left hip hemiarthroplasty to be performed on 

2019 after a medical evaluation and clearance.


-The patient is to be made n.p.o. at midnight tonight


-Bed rest


-Hold all chemical DVT prophylaxis at midnight tonight


-Ancef on-call for the OR


-Multimodal pain management








(2) Shoulder fracture, left


Plan: 


She has a left shoulder fracture dislocation with a greater tuberosity fracture.

 This appeared to reduce well with her shoulder reduction and may heal in 

position.  We will allow her to continue in the sling until further evaluation. 

I will get an axillary view to confirm reduction

## 2019-12-30 NOTE — EKG REPORT
SEVERITY:- BORDERLINE ECG -

SINUS RHYTHM

DIFFUSE NONSPECIFIC ST-T CHANGES

:

Confirmed by: Virgil Miles MD 30-Dec-2019 08:03:50

## 2019-12-31 LAB
ALBUMIN SERPL-MCNC: 3.4 G/DL (ref 3.5–5)
ALP SERPL-CCNC: 71 U/L (ref 38–126)
ANION GAP SERPL CALC-SCNC: 7 MMOL/L (ref 5–19)
AST SERPL-CCNC: 26 U/L (ref 14–36)
BILIRUB DIRECT SERPL-MCNC: 0.1 MG/DL (ref 0–0.4)
BILIRUB SERPL-MCNC: 0.6 MG/DL (ref 0.2–1.3)
BUN SERPL-MCNC: 19 MG/DL (ref 7–20)
CALCIUM: 8.9 MG/DL (ref 8.4–10.2)
CHLORIDE SERPL-SCNC: 103 MMOL/L (ref 98–107)
CO2 SERPL-SCNC: 28 MMOL/L (ref 22–30)
ERYTHROCYTE [DISTWIDTH] IN BLOOD BY AUTOMATED COUNT: 13.5 % (ref 11.5–14)
GLUCOSE SERPL-MCNC: 107 MG/DL (ref 75–110)
HCT VFR BLD CALC: 34.9 % (ref 36–47)
HGB BLD-MCNC: 11.9 G/DL (ref 12–15.5)
MCH RBC QN AUTO: 33.4 PG (ref 27–33.4)
MCHC RBC AUTO-ENTMCNC: 34.1 G/DL (ref 32–36)
MCV RBC AUTO: 98 FL (ref 80–97)
PLATELET # BLD: 239 10^3/UL (ref 150–450)
POTASSIUM SERPL-SCNC: 3.9 MMOL/L (ref 3.6–5)
PROT SERPL-MCNC: 5.7 G/DL (ref 6.3–8.2)
RBC # BLD AUTO: 3.56 10^6/UL (ref 3.72–5.28)
WBC # BLD AUTO: 10 10^3/UL (ref 4–10.5)

## 2019-12-31 PROCEDURE — 0SRS0JZ REPLACEMENT OF LEFT HIP JOINT, FEMORAL SURFACE WITH SYNTHETIC SUBSTITUTE, OPEN APPROACH: ICD-10-PCS

## 2019-12-31 RX ADMIN — OXYCODONE HYDROCHLORIDE PRN MG: 5 TABLET ORAL at 02:10

## 2019-12-31 RX ADMIN — DOCUSATE SODIUM SCH: 100 CAPSULE, LIQUID FILLED ORAL at 12:25

## 2019-12-31 RX ADMIN — ONDANSETRON PRN MG: 4 TABLET, ORALLY DISINTEGRATING ORAL at 13:08

## 2019-12-31 RX ADMIN — CEFAZOLIN SCH MLS/HR: 1 INJECTION, POWDER, FOR SOLUTION INTRAVENOUS at 18:24

## 2019-12-31 RX ADMIN — ACETAMINOPHEN SCH: 325 TABLET ORAL at 05:26

## 2019-12-31 RX ADMIN — ONDANSETRON PRN MG: 4 TABLET, ORALLY DISINTEGRATING ORAL at 21:50

## 2019-12-31 RX ADMIN — Medication SCH: at 12:26

## 2019-12-31 RX ADMIN — MORPHINE SULFATE PRN MG: 10 INJECTION INTRAMUSCULAR; INTRAVENOUS; SUBCUTANEOUS at 13:04

## 2019-12-31 RX ADMIN — FOLIC ACID SCH: 1 TABLET ORAL at 12:25

## 2019-12-31 RX ADMIN — MULTIVITAMIN TABLET SCH: TABLET at 12:25

## 2019-12-31 RX ADMIN — ATORVASTATIN CALCIUM SCH MG: 10 TABLET, FILM COATED ORAL at 21:47

## 2019-12-31 RX ADMIN — OXYCODONE HYDROCHLORIDE PRN MG: 5 TABLET ORAL at 21:47

## 2019-12-31 RX ADMIN — ACETAMINOPHEN SCH MG: 325 TABLET ORAL at 17:58

## 2019-12-31 RX ADMIN — TRAMADOL HYDROCHLORIDE PRN MG: 50 TABLET, FILM COATED ORAL at 20:22

## 2019-12-31 RX ADMIN — OXYCODONE HYDROCHLORIDE PRN MG: 5 TABLET ORAL at 14:48

## 2019-12-31 RX ADMIN — MORPHINE SULFATE PRN MG: 10 INJECTION INTRAMUSCULAR; INTRAVENOUS; SUBCUTANEOUS at 17:59

## 2019-12-31 RX ADMIN — ENOXAPARIN SODIUM SCH: 30 INJECTION SUBCUTANEOUS at 12:25

## 2019-12-31 RX ADMIN — PANTOPRAZOLE SODIUM SCH: 20 TABLET, DELAYED RELEASE ORAL at 05:26

## 2019-12-31 RX ADMIN — ACETAMINOPHEN SCH MG: 325 TABLET ORAL at 14:47

## 2019-12-31 RX ADMIN — MORPHINE SULFATE PRN MG: 10 INJECTION INTRAMUSCULAR; INTRAVENOUS; SUBCUTANEOUS at 05:18

## 2019-12-31 NOTE — RADIOLOGY REPORT (SQ)
EXAM DESCRIPTION:  SHOULDER LEFT 1 VIEW



COMPLETED DATE/TIME:  12/30/2019 7:46 pm



REASON FOR STUDY:  Shoulder dislocation



COMPARISON:  12/30/2019.



FINDINGS:  Single left axillary view shows no evidence of dislocation.  Fracture through the posterol
ateral humeral head, as before.



TECHNICAL DOCUMENTATION:  JOB ID:  [id number]



Reading location - IP/workstation name: [email or web address]

## 2019-12-31 NOTE — RADIOLOGY REPORT (SQ)
EXAM DESCRIPTION:  PELVIS AP



COMPLETED DATE/TIME:  12/31/2019 12:02 pm



REASON FOR STUDY:  post op



COMPARISON:  None.



NUMBER OF VIEWS:  One view(s).



TECHNIQUE:  Digital radiographic images of the left hip post-procedure.



LIMITATIONS:  None.



FINDINGS:  BONES: No worrisome or unexpected findings post-procedure.

DEVICE: Bi-polar prothesis. Device appears in appropriate location.

SOFT TISSUES:  No worrisome findings.  Expected postoperative soft tissue changes.



IMPRESSION:   SATISFACTORY POSTOPERATIVE LEFT HIP.



TECHNICAL DOCUMENTATION:  JOB ID:  [id number]

 2011 [employer]- All Rights Reserved



Reading location - IP/workstation name: [email or web address]

## 2019-12-31 NOTE — PDOC PROGRESS REPORT
Subjective


Progress Note for:: 12/31/19


Subjective:: 





Patient is feeling well this morning.  Prepared for surgery.  No new complaints 

or acute events overnight


Reason For Visit: 


LEFT FEMORAL NECK FRACTURE








Physical Exam


Vital Signs: 


                                        











Temp Pulse Resp BP Pulse Ox


 


 98.2 F   81   16   143/81 H  92 


 


 12/31/19 00:21  12/31/19 00:21  12/31/19 00:21  12/31/19 00:21  12/31/19 00:21








                                 Intake & Output











 12/30/19 12/31/19 01/01/20





 06:59 06:59 06:59


 


Output Total 550 600 


 


Balance -550 -600 


 


Weight 60.3 kg 60.3 kg 











Physical Exam: 





General appearance: PRESENT: no acute distress, cooperative, well-nourished


Head exam: PRESENT: atraumatic, normocephalic


Eye exam: PRESENT: EOMI


Ear exam: PRESENT: normal external ear exam


Mouth exam: PRESENT: neck supple


Neck exam: ABSENT: tracheal deviation


Respiratory exam: PRESENT: symmetrical, unlabored.  ABSENT: accessory muscle 

use, wheezes


Pulses: PRESENT: normal radial pulses, normal dorsalis pedis pulse


Vascular exam: PRESENT: normal capillary refill


GI/Abdominal exam: ABSENT: distended, firm


Extremities exam: PRESENT: full ROM of bilateral shoulders, elbows wrists, 

knees, hips and ankles without pain


Musculoskeletal exam: PRESENT: full ROM, normal inspection of all 4 extremities 

aside from that noted below. 


Neurological exam: PRESENT: alert, awake, oriented to person, oriented to place,

oriented to time


Psychiatric exam: PRESENT: appropriate affect.  ABSENT: agitated


Focused psych exam: ABSENT: catatonic


Skin exam: PRESENT: intact.  ABSENT: dry





All as above aside from that noted in the HPI and the following:


LUE


Left upper extremity sensation grossly intact to radial median and ulnar nerve.


Left upper extremity motor function grossly intact to radian median ulnar nerve 

AIN and PIN


Pulses 2+, capillary refill less than 2 seconds


No deformity noted full range of motion of the elbow wrist and fingers without 

pain


Compartments soft, no tenderness to palpation 


skin intact


Left shoulder was in sling at time of exam, some tenderness to palpation about 

the shoulder.





Left lower extremity


-Pulses 2+ distally


-Compartments soft


-Sensation grossly intact to L3-4-5 S1


-Motor grossly intact to EHL TA gastroc and quad


-Pain with any range of motion of the left hip





Results


Laboratory Results: 


                                        





                                 12/31/19 04:00 





                                 12/31/19 04:00 





                                        











  12/31/19 12/31/19





  04:00 04:00


 


WBC  10.0 


 


RBC  3.56 L 


 


Hgb  11.9 L 


 


Hct  34.9 L 


 


MCV  98 H 


 


MCH  33.4 


 


MCHC  34.1 


 


RDW  13.5 


 


Plt Count  239 


 


Sodium   138.0


 


Potassium   3.9


 


Chloride   103


 


Carbon Dioxide   28


 


Anion Gap   7


 


BUN   19


 


Creatinine   0.77


 


Est GFR (African Amer)   > 60


 


Glucose   107


 


Calcium   8.9


 


Total Bilirubin   0.6


 


AST   26


 


Alkaline Phosphatase   71


 


Total Protein   5.7 L


 


Albumin   3.4 L








                                        











  12/29/19 12/29/19





  23:56 23:56


 


Creatine Kinase  220 H 


 


Troponin I   < 0.012











Impressions: 


                                        





Hip X-Ray  12/29/19 21:23


IMPRESSION:


 


Left subcapital hip fracture as above


 


 


copyright 2011 [employer]- All Rights Reserved


 








Chest X-Ray  12/29/19 23:13


IMPRESSION:


 


No acute cardiopulmonary process


 


 


copyright 2011 [employer]- All Rights Reserved


 








Shoulder X-Ray  12/30/19 01:20


IMPRESSION:


 


1. Interval reduction of the left shoulder dislocation.


2. Mildly displaced fracture through the greater tuberosity.


3. Mild diffuse bone demineralization. 


 














Assessment & Plan





- Diagnosis


(1) Hip fracture, left


Qualifiers: 


   Encounter type: initial encounter   Fracture type: closed   Qualified 

Code(s): S72.002A - Fracture of unspecified part of neck of left femur, initial 

encounter for closed fracture   


Is this a current diagnosis for this admission?: Yes   


Plan: 


Plan for operative intervention today.


Keep n.p.o.


-Ancef preoperatively and for 24 hours perioperatively


-We will place her on aspirin for DVT prophylaxis postop


-Weightbearing as tolerated after surgery with physical therapy to ambulate same

day


-We will consult case management for potential placement.


-Plan for discharge pending improvement over the few days


-Dressing to stay in place until seen in the office








(2) Shoulder fracture, left


Qualifiers: 


   Encounter type: initial encounter   Fracture type: closed   Qualified 

Code(s): S42.92XA - Fracture of left shoulder girdle, part unspecified, initial 

encounter for closed fracture   


Is this a current diagnosis for this admission?: Yes   





- Time


Time Spent with patient: Less than 15 minutes

## 2019-12-31 NOTE — RADIOLOGY REPORT (SQ)
EXAM DESCRIPTION:  NO CHG FLUORO; HIP IN OPERATING RM



COMPLETED DATE/TIME:  12/31/2019 12:02 pm



REASON FOR STUDY:  LEFT HIP SATHISH ARTHROPLASTY ASST WITH FLUORO IN OR



COMPARISON:  None.



FLUOROSCOPY TIME:  0.1 minutes

1 images saved to PACS.



TECHNIQUE:  Intra-operative images acquired during surgical procedure to evaluate progress.

NUMBER OF IMAGES: 1



LIMITATIONS:  Limited intraoperative fluoroscopic images.



FINDINGS:  Limited intraoperative fluoroscopic image demonstrates evidence of left hip arthroplasty. 
 Please see operative report for detailed description.



IMPRESSION:  IMAGE(S) OBTAINED DURING PROCEDURE.



COMMENT:  Quality :  Final reports for procedures using fluoroscopy that document radiation exp
osure indices, or exposure time and number of fluorographic images (if radiation exposure indices are
 not available)

Please consult full operative report of the attending physician for description of the procedure.



TECHNICAL DOCUMENTATION:  JOB ID:  [id number]

 2011 [employer]- All Rights Reserved



Reading location - IP/workstation name: [email or web address]

## 2019-12-31 NOTE — OPERATIVE REPORT
Operative Report


DATE OF SURGERY: 12/31/19


PREOPERATIVE DIAGNOSIS: Displaced left femoral neck fracture


POSTOPERATIVE DIAGNOSIS: Displaced left femoral neck fracture


OPERATION: Left hip hemiarthroplasty


SURGEON: ADELA DUMONT JR


ANESTHESIA: Spinal


COMPLICATIONS: 





None


ESTIMATED BLOOD LOSS: 150 cc


PROCEDURE: 


The patient was brought to the operative suite where they underwent spinal 

anesthesia.  After adequate anesthesia, they were placed supine on the operating

table and prepped and draped in standard sterile fashion.  2 g of Ancef was gi

erickson preoperatively.  A standard anterior incision was made with cautery through 

the soft tissue down to the tensor fascia.  This was then sharply incised and 

then bluntly developed until reaching the crossing vasculature.  These were 

cauterized and then with the assistance of retraction the hip capsule was 

isolated.  An incision was made into the hip capsule with cautery and the 

fracture was exposed.  A freshening neck cut was made at the appropriate level 

for stem implantation.  The head was then removed and all debris within the 

capsule at this point was removed a trial head implant was placed starting with 

a 47 which had excellent fit.  We then turned our attention to the femur which 

we carefully exposed.  No further fracture was noted along the calcar.  Starting

with a  we then progressed to a straight reamer and subsequently 

reamed up to a 9.  We followed this with broaching starting with a 3 and finally

obtaining excellent fit at a 8.  A standard offset trial with a standard neck 

length was placed on the broach and reduced.  This produced excellent fit, no 

impingement, minimal shuck, equal leg length, and no position of instability 

throughout a full range of motion.  Intraoperative fluoroscopy was utilized to 

confirm correct stem position.  After this the hip was dislocated the trials 

were removed and the wound was thoroughly irrigated with 2 L of antibiotic imp

regnated normal saline.  Fresh drape was then placed, all gloves were changed 

and then the final stem was impacted.  The trunnion was cleaned and dried and 

the bipolar head assembly was then impacted.  The acetabulum was irrigated again

and visually inspected for any debris and subsequently the hip was reduced.  

This again taken through a range of motion which demonstrated excellent 

stability and leg length.  The wound was then irrigated with 50% normal saline 

and iodine solution, after this was evacuated and evaluated for any potential 

bleeding which was cauterized.  The wound was irrigated once more with normal 

saline impregnated with antibiotics and suctioned and then 1 g of vancomycin was

placed inside the joint and along the surrounding soft tissue.  The fascia was 

then closed with barbed absorbable suture followed by local anesthetic injection

to the surrounding soft tissue as well as to the hip joint.  The adipose layer 

was also closed with this suture, followed with 2-0 monocryl subcutaneous 

interrupted and finally 3-0 Monocryl running subcutaneously.  Skin glue was 

applied and once dry a silver dressing was applied followed by a final occlusive

dressing.  The drapes were removed and the patient was then transferred to PACU 

in stable condition.





The patient can now be made weightbearing as tolerated.  She will receive 

aspirin per my recommendation for DVT prophylaxis for 6 weeks, 325 mg daily.  

Additionally she should receive coverage for her UTI for the at least the next 7

days.  Physical therapy should see her soon as possible to encourage 

mobilization and activities of daily living training.

## 2019-12-31 NOTE — RADIOLOGY REPORT (SQ)
EXAM DESCRIPTION:  NO CHG FLUORO; HIP IN OPERATING RM



COMPLETED DATE/TIME:  12/31/2019 12:02 pm



REASON FOR STUDY:  LEFT HIP SATHISH ARTHROPLASTY ASST WITH FLUORO IN OR



COMPARISON:  None.



FLUOROSCOPY TIME:  0.1 minutes

1 images saved to PACS.



TECHNIQUE:  Intra-operative images acquired during surgical procedure to evaluate progress.

NUMBER OF IMAGES: 1



LIMITATIONS:  Limited intraoperative fluoroscopic images.



FINDINGS:  Limited intraoperative fluoroscopic image demonstrates evidence of left hip arthroplasty. 
 Please see operative report for detailed description.



IMPRESSION:  IMAGE(S) OBTAINED DURING PROCEDURE.



COMMENT:  Quality :  Final reports for procedures using fluoroscopy that document radiation exp
osure indices, or exposure time and number of fluorographic images (if radiation exposure indices are
 not available)

Please consult full operative report of the attending physician for description of the procedure.



TECHNICAL DOCUMENTATION:  JOB ID:  [id number]

 2011 [employer]- All Rights Reserved



Reading location - IP/workstation name: [email or web address]

## 2020-01-01 LAB
ERYTHROCYTE [DISTWIDTH] IN BLOOD BY AUTOMATED COUNT: 13.3 % (ref 11.5–14)
HCT VFR BLD CALC: 32.7 % (ref 36–47)
HGB BLD-MCNC: 11.3 G/DL (ref 12–15.5)
MCH RBC QN AUTO: 33.8 PG (ref 27–33.4)
MCHC RBC AUTO-ENTMCNC: 34.6 G/DL (ref 32–36)
MCV RBC AUTO: 98 FL (ref 80–97)
PLATELET # BLD: 200 10^3/UL (ref 150–450)
RBC # BLD AUTO: 3.35 10^6/UL (ref 3.72–5.28)
WBC # BLD AUTO: 8.5 10^3/UL (ref 4–10.5)

## 2020-01-01 RX ADMIN — Medication SCH MG: at 09:47

## 2020-01-01 RX ADMIN — FOLIC ACID SCH MG: 1 TABLET ORAL at 09:47

## 2020-01-01 RX ADMIN — CEFAZOLIN SCH MLS/HR: 1 INJECTION, POWDER, FOR SOLUTION INTRAVENOUS at 00:20

## 2020-01-01 RX ADMIN — ATORVASTATIN CALCIUM SCH MG: 10 TABLET, FILM COATED ORAL at 21:18

## 2020-01-01 RX ADMIN — OXYCODONE HYDROCHLORIDE PRN MG: 5 TABLET ORAL at 13:01

## 2020-01-01 RX ADMIN — ASPIRIN 325 MG ORAL TABLET SCH: 325 PILL ORAL at 09:48

## 2020-01-01 RX ADMIN — DOCUSATE SODIUM SCH MG: 100 CAPSULE, LIQUID FILLED ORAL at 09:47

## 2020-01-01 RX ADMIN — MORPHINE SULFATE PRN MG: 10 INJECTION INTRAMUSCULAR; INTRAVENOUS; SUBCUTANEOUS at 18:01

## 2020-01-01 RX ADMIN — ACETAMINOPHEN SCH MG: 325 TABLET ORAL at 13:01

## 2020-01-01 RX ADMIN — PANTOPRAZOLE SODIUM SCH MG: 20 TABLET, DELAYED RELEASE ORAL at 05:15

## 2020-01-01 RX ADMIN — MORPHINE SULFATE PRN MG: 10 INJECTION INTRAMUSCULAR; INTRAVENOUS; SUBCUTANEOUS at 00:20

## 2020-01-01 RX ADMIN — MULTIVITAMIN TABLET SCH TAB: TABLET at 09:47

## 2020-01-01 RX ADMIN — MORPHINE SULFATE PRN MG: 10 INJECTION INTRAMUSCULAR; INTRAVENOUS; SUBCUTANEOUS at 09:54

## 2020-01-01 RX ADMIN — ACETAMINOPHEN SCH: 325 TABLET ORAL at 02:08

## 2020-01-01 RX ADMIN — TRAMADOL HYDROCHLORIDE PRN MG: 50 TABLET, FILM COATED ORAL at 21:18

## 2020-01-01 RX ADMIN — ACETAMINOPHEN SCH MG: 325 TABLET ORAL at 17:22

## 2020-01-01 RX ADMIN — ACETAMINOPHEN SCH MG: 325 TABLET ORAL at 05:15

## 2020-01-01 RX ADMIN — MORPHINE SULFATE PRN MG: 10 INJECTION INTRAMUSCULAR; INTRAVENOUS; SUBCUTANEOUS at 05:16

## 2020-01-01 RX ADMIN — OXYCODONE HYDROCHLORIDE PRN MG: 5 TABLET ORAL at 19:56

## 2020-01-01 NOTE — PDOC PROGRESS REPORT
Subjective


Progress Note for:: 01/01/20


Subjective:: 


Patient is a 74 year old female with a past medical history of MI and PNA who 

was admitted 12/29/19 for left hip fracture and underwent hip arthroplasty by 

Dr. Grigsby today.





Patient was seen on morning rounds.  She was lying in bed, comfortably, on room 

air.  She is hopeful to discharge directly to home; does not want to go to 

rehab.  I recommended that she work with physical therapy again today before 

making her decision and to discuss this further with orthopedics and discharge 

planning.


Otherwise, she had no specific questions or concerns.


Denies fever, chills, chest pain, dyspnea, orthopnea, cough, abdominal pain, 

nausea and vomiting.  Pain is well controlled.





No concerns per nursing.





Reason For Visit: 


LEFT FEMORAL NECK FRACTURE








Physical Exam


Vital Signs: 


                                        











Temp Pulse Resp BP Pulse Ox


 


 98.8 F   84   18   130/64 H  95 


 


 01/01/20 12:00  01/01/20 12:00  01/01/20 12:00  01/01/20 12:00 01/01/20 12:00








                                 Intake & Output











 12/31/19 01/01/20 01/02/20





 06:59 06:59 06:59


 


Intake Total  2547 


 


Output Total 600 1025 


 


Balance -600 1522 


 


Weight 60.3 kg 63.5 kg 











General appearance: PRESENT: no acute distress, cooperative, well-developed, 

well-nourished


Head exam: PRESENT: atraumatic, normocephalic


Eye exam: PRESENT: conjunctiva pink, EOMI, PERRLA.  ABSENT: scleral icterus


Ear exam: PRESENT: normal external ear exam


Mouth exam: PRESENT: moist, tongue midline


Respiratory exam: PRESENT: clear to auscultation phoebe, symmetrical, unlabored.  

ABSENT: rales, rhonchi, wheezes


Cardiovascular exam: PRESENT: RRR, +S1, +S2.  ABSENT: diastolic murmur, rubs, 

systolic murmur


Pulses: PRESENT: normal dorsalis pedis pul


Vascular exam: PRESENT: normal capillary refill


GI/Abdominal exam: PRESENT: normal bowel sounds, soft.  ABSENT: distended, 

guarding, mass, organolmegaly, rebound, tenderness


Rectal exam: PRESENT: deferred


Extremities exam: PRESENT: full ROM, tenderness - Left hip, left upper 

extremity.  ABSENT: calf tenderness, clubbing, pedal edema


Musculoskeletal exam: PRESENT: ambulatory - With hemiwalker


Neurological exam: PRESENT: alert, awake, oriented to person, oriented to place,

oriented to time, oriented to situation, CN II-XII grossly intact.  ABSENT: 

motor sensory deficit


Psychiatric exam: PRESENT: appropriate affect, normal mood.  ABSENT: homicidal 

ideation, suicidal ideation


Skin exam: PRESENT: dry, warm.  ABSENT: cyanosis, rash





Results


Laboratory Results: 


                                        





                                 01/01/20 06:40 





                                 12/31/19 04:00 





                                        











  01/01/20





  06:40


 


WBC  8.5


 


RBC  3.35 L


 


Hgb  11.3 L


 


Hct  32.7 L


 


MCV  98 H


 


MCH  33.8 H


 


MCHC  34.6


 


RDW  13.3


 


Plt Count  200








                                        











  12/29/19 12/29/19





  23:56 23:56


 


Creatine Kinase  220 H 


 


Troponin I   < 0.012











Impressions: 


                                        





Chest X-Ray  12/29/19 23:13


IMPRESSION:


 


No acute cardiopulmonary process


 


 


copyright 2011 [employer]- All Rights Reserved


 








Shoulder X-Ray  12/30/19 01:20


IMPRESSION:


 


1. Interval reduction of the left shoulder dislocation.


2. Mildly displaced fracture through the greater tuberosity.


3. Mild diffuse bone demineralization. 


 








Fluoroscopy  12/31/19 00:00


IMPRESSION:  IMAGE(S) OBTAINED DURING PROCEDURE.


 








Hip X-Ray  12/31/19 00:00


IMPRESSION:  IMAGE(S) OBTAINED DURING PROCEDURE.


 








Pelvis X-Ray  12/31/19 00:00


IMPRESSION:   SATISFACTORY POSTOPERATIVE LEFT HIP.


 














Assessment and Plan





- Diagnosis


(1) Hip fracture, left


Qualifiers: 


   Encounter type: initial encounter   Fracture type: closed   Qualified 

Code(s): S72.002A - Fracture of unspecified part of neck of left femur, initial 

encounter for closed fracture   


Is this a current diagnosis for this admission?: Yes   


Plan: 


Now s/p left hip arthroplasty by Dr. Grigsby. 


Recommends full dose aspirin daily x6 weeks for DVT prophylaxis.


Analgesics as needed.


PT/OT consulted.


Discharge planning consulted.








(2) HLD (hyperlipidemia)


Is this a current diagnosis for this admission?: Yes   


Plan: 


Patient is placed on a regular diet.


Continue home dose statin therapy.








(3) Tobacco dependence


Is this a current diagnosis for this admission?: Yes   


Plan: 


Smoking cessation encouraged.


Nicotine replacement therapies provided.





Aggressive pulmonary toilet during the intraoperative period; IS and flutter dejan

ve to bedside.  As needed nebulizer treatments available.








(4) Shoulder fracture, left


Qualifiers: 


   Encounter type: initial encounter   Fracture type: closed   Qualified 

Code(s): S42.92XA - Fracture of left shoulder girdle, part unspecified, initial 

encounter for closed fracture   


Is this a current diagnosis for this admission?: Yes   


Plan: 


Primary management per orthopedic team.


Sling.


Analgesics as needed.








(5) Dislocated shoulder


Qualifiers: 


   Laterality: left 


Is this a current diagnosis for this admission?: Yes   


Plan: 


Status post reduction while in the emergency department.


Primary management per orthopedic team.


Analgesics as needed.








(6) Acute alcohol intoxication


Qualifiers: 


   Complication of substance-induced condition: uncomplicated   Qualified 

Code(s): F10.920 - Alcohol use, unspecified with intoxication, uncomplicated   


Is this a current diagnosis for this admission?: Yes   


Plan: 


At time of admission, serum alcohol level of 111.


Patient admits to occasional/social alcohol intake. 


Discussed further with patient today; she denies regular alcohol intake.  She 

reports that she had had "a few cocktails and some bad luck" resulting in her 

admission.  She denies alcohol dependence or any history of withdrawal.  Her v

ital signs are stable and she is not showing objective signs of early alcohol 

withdrawal; I do not believe that she is at risk.





We will provide daily multivitamin, folic acid, and thiamine supplementation.








- Plan Summary


Summary: 


The patient is medically stable for discharge from the hospitalist perspective.


We will sign off at this time.


Please reconsult if we can be of any further assistance.





- Time


Time Spent with patient: 15-24 minutes


Medications reviewed and adjusted accordingly: Yes


Anticipated discharge: Home with Homehealth - vs SNF for rehab


Within: when bed available

## 2020-01-01 NOTE — PDOC PROGRESS REPORT
Subjective


Progress Note for:: 01/01/20


Subjective:: 





Patient is feeling well this morning.  She does have some ache but otherwise is 

well controlled on current pain management.


Reason For Visit: 


LEFT FEMORAL NECK FRACTURE








Physical Exam


Vital Signs: 


                                        











Temp Pulse Resp BP Pulse Ox


 


 98.5 F   99   16   126/65 H  96 


 


 01/01/20 00:23  01/01/20 00:23  01/01/20 00:23  01/01/20 00:23  01/01/20 00:23








                                 Intake & Output











 12/31/19 01/01/20 01/02/20





 06:59 06:59 06:59


 


Intake Total  2547 


 


Output Total 600 1025 


 


Balance -600 1522 


 


Weight 60.3 kg 63.5 kg 











Physical Exam: 


Left lower extremity


-Pulses 2+ distally


-Compartments soft


-Wound clean dry and intact no drainage in appropriate swelling for postop day


-Sensation grossly intact to L3-4-5 S1


-Motor grossly intact to EHL TA gastroc and quad


- Able to perform quad extension and elevate heel off of bed.





LUE


Left upper extremity sensation grossly intact to radial median and ulnar nerve.


Left upper extremity motor function grossly intact to radian median ulnar nerve 

AIN and PIN


Pulses 2+, capillary refill less than 2 seconds


No deformity noted full range of motion of the elbow wrist and fingers without 

pain


Compartments soft, no tenderness to palpation 


skin intact


some tenderness to palpation about the shoulder.  Overall she is tolerating her 

shoulder injury very well with very little pain.





Results


Laboratory Results: 


                                        





                                 01/01/20 06:40 





                                 12/31/19 04:00 





                                        











  01/01/20





  06:40


 


WBC  8.5


 


RBC  3.35 L


 


Hgb  11.3 L


 


Hct  32.7 L


 


MCV  98 H


 


MCH  33.8 H


 


MCHC  34.6


 


RDW  13.3


 


Plt Count  200








                                        











  12/29/19 12/29/19





  23:56 23:56


 


Creatine Kinase  220 H 


 


Troponin I   < 0.012











Impressions: 


                                        





Chest X-Ray  12/29/19 23:13


IMPRESSION:


 


No acute cardiopulmonary process


 


 


copyright 2011 [employer]- All Rights Reserved


 








Shoulder X-Ray  12/30/19 01:20


IMPRESSION:


 


1. Interval reduction of the left shoulder dislocation.


2. Mildly displaced fracture through the greater tuberosity.


3. Mild diffuse bone demineralization. 


 








Fluoroscopy  12/31/19 00:00


IMPRESSION:  IMAGE(S) OBTAINED DURING PROCEDURE.


 








Hip X-Ray  12/31/19 00:00


IMPRESSION:  IMAGE(S) OBTAINED DURING PROCEDURE.


 








Pelvis X-Ray  12/31/19 00:00


IMPRESSION:   SATISFACTORY POSTOPERATIVE LEFT HIP.


 














Assessment & Plan





- Diagnosis


(1) Hip fracture, left


Qualifiers: 


   Encounter type: initial encounter   Fracture type: closed   Qualified 

Code(s): S72.002A - Fracture of unspecified part of neck of left femur, initial 

encounter for closed fracture   


Is this a current diagnosis for this admission?: Yes   


Plan: 


-  2 doses of Ancef postoperatively q 8 hours to complete 24 hours 

perioperatively 


-Weightbearing as tolerated, no precautions, encourage out of bed ASAP for ADL 

training


- PT/OT


- Keep knee extended in bed, rolled towel under the ankle to obtain full 

extension


-aspirin 325 daily for DVT prophylaxis for 6 weeks


-multimodal pain management to avoid excessive narcotics, including gabapentin, 

tramadol, Toradol, acetaminophen.


-Dressing should not be removed for 7 to 10 days until seen in the office


-May shower with the dressing intact, if it starts to come off she should not 

get the incision wet.


-I would like to follow the patient my office within the next 7 to 10 days at 

[address] [place] [address]. in [place] office #: [phone]





-As per her left shoulder she should not be performing active abduction.  Limit 

weightbearing as much as possible with the understanding that she may need to 

use her left arm to assist somewhat during rehabbing her left hip








(2) Shoulder fracture, left


Qualifiers: 


   Encounter type: initial encounter   Fracture type: closed   Qualified 

Code(s): S42.92XA - Fracture of left shoulder girdle, part unspecified, initial 

encounter for closed fracture   


Is this a current diagnosis for this admission?: Yes   





- Time


Time Spent with patient: Less than 15 minutes

## 2020-01-02 VITALS — SYSTOLIC BLOOD PRESSURE: 125 MMHG | DIASTOLIC BLOOD PRESSURE: 61 MMHG

## 2020-01-02 RX ADMIN — DOCUSATE SODIUM SCH MG: 100 CAPSULE, LIQUID FILLED ORAL at 09:32

## 2020-01-02 RX ADMIN — MORPHINE SULFATE PRN MG: 10 INJECTION INTRAMUSCULAR; INTRAVENOUS; SUBCUTANEOUS at 04:06

## 2020-01-02 RX ADMIN — ASPIRIN 325 MG ORAL TABLET SCH: 325 PILL ORAL at 09:28

## 2020-01-02 RX ADMIN — FOLIC ACID SCH MG: 1 TABLET ORAL at 09:32

## 2020-01-02 RX ADMIN — Medication SCH MG: at 09:32

## 2020-01-02 RX ADMIN — ACETAMINOPHEN SCH: 325 TABLET ORAL at 00:17

## 2020-01-02 RX ADMIN — PANTOPRAZOLE SODIUM SCH MG: 20 TABLET, DELAYED RELEASE ORAL at 05:55

## 2020-01-02 RX ADMIN — MULTIVITAMIN TABLET SCH TAB: TABLET at 09:32

## 2020-01-02 RX ADMIN — ACETAMINOPHEN SCH MG: 325 TABLET ORAL at 11:50

## 2020-01-02 RX ADMIN — ACETAMINOPHEN SCH MG: 325 TABLET ORAL at 05:55

## 2020-01-02 RX ADMIN — ONDANSETRON PRN MG: 4 TABLET, ORALLY DISINTEGRATING ORAL at 08:48

## 2020-01-02 RX ADMIN — MORPHINE SULFATE PRN MG: 10 INJECTION INTRAMUSCULAR; INTRAVENOUS; SUBCUTANEOUS at 14:08

## 2020-01-02 NOTE — PDOC DISCHARGE SUMMARY
Impression





- Admit/DC Date/PCP


Admission Date/Primary Care Provider: 


  12/30/19 00:54





  JOSE ROWLAND NP





Discharge Date: 01/02/20





- Discharge Diagnosis


(1) Hip fracture, left


Is this a current diagnosis for this admission?: Yes   





(2) Shoulder fracture, left


Is this a current diagnosis for this admission?: Yes   





- Assessment


Summary: 


Mrs. Simpson is a very pleasant 74-year-old female who presented who presented to

the emergency department after a fall at home suffering from a left proximal 

humerus fractrue dislocation and left femoral neck fracture.  The shoulder was 

reduced in the emergency department.  After a thorough work-up including x-rays 

and pre-operative hospitalist consult, as well as discussing risks and benefits 

and other treatment options, the patient elected to proceed with a left hip 

hemiarthroplasty.





They were brought to the operating room on 12/31/2020 and underwent a left hip 

hemiarthroplasty and tolerated procedure very well with out complication.  They 

remained in the hospital for postoperative medical management, monitoring, and 

pain control and physical therapy.  On postoperative day #1 they were ambulating

well with physical therapy, but still having difficulty due to her associated 

LUE fracture.  On POD#2 they were continuing to improve and re-evaluated by PT, 

to the degree that they approved them for discharge to rehab.  They were 

discharged to rehab on 1/2/2020.  They had no acute events or complications over

the course of their stay.  All detailed instructions and prescriptions were 

provided to the patient prior to admission on the year prior office visit.








The patient is medically stable for discharge from the hospitalist perspective.





- Additional Information


Resuscitation Status: Full Code


Discharge Diet: As Tolerated


Discharge Activity: Activity As Tolerated


Referrals: 


ADELA DUMONT JR, DO [ACTIVE PROVISIONAL STAFF] - 


Prescriptions: 


Aspirin [Aspirin 325 mg Tablet] 325 mg PO DAILY #42 tablet


Oxycodone HCl [Oxy-Ir 5 mg Tablet] 5 mg PO Q4HP PRN #30 tablet


 PRN Reason: 


Tramadol HCl [Ultram 50 mg Tablet] 50 mg PO Q6HP PRN #30 tablet


 PRN Reason: 


Home Medications: 








Atorvastatin Calcium [Lipitor 10 mg Tablet] 10 mg PO ASDIR 06/08/18 


Ibuprofen 200 mg PO DAILYP PRN 12/30/19 


Lisinopril [Prinivil 2.5 mg Tablet] 2.5 mg PO DAILYP PRN 12/30/19 


Acetaminophen [Tylenol 325 mg Tablet] 650 mg PO Q6  tablet 01/01/20 


Aspirin [Aspirin 325 mg Tablet] 325 mg PO DAILY #42 tablet 01/01/20 


Oxycodone HCl [Oxy-Ir 5 mg Tablet] 5 mg PO Q4HP PRN #30 tablet 01/01/20 


Tramadol HCl [Ultram 50 mg Tablet] 50 mg PO Q6HP PRN #30 tablet 01/01/20 











History of Present Illiness


History of Present Illness: 





Mrs. Simpson is a very pleasant 74-year-old female who presented who presented to

the emergency department after a fall at home suffering from a left proximal 

humerus fractrue dislocation and left femoral neck fracture.  The shoulder was 

reduced in the emergency department.  After a thorough work-up including x-rays 

and pre-operative hospitalist consult, as well as discussing risks and benefits 

and other treatment options, the patient elected to proceed with a left hip 

hemiarthroplasty.





Hospital Course


Hospital Course: 


They were brought to the operating room on 12/31/2020 and underwent a left hip 

hemiarthroplasty and tolerated procedure very well with out complication.  They 

remained in the hospital for postoperative medical management, monitoring, and 

pain control and physical therapy.  On postoperative day #1 they were ambulating

well with physical therapy, but still having difficulty due to her associated 

LUE fracture.  On POD#2 they were continuing to improve and re-evaluated by PT, 

to the degree that they approved them for discharge to rehab.  They were 

discharged to rehab on 1/2/2020.  They had no acute events or complications over

the course of their stay.  All detailed instructions and prescriptions were p

rovided to the patient prior to admission on the year prior office visit.





Physical Exam


Vital Signs: 


                                        











Temp Pulse Resp BP Pulse Ox


 


 98.7 F   62   16   114/53 L  92 


 


 01/02/20 07:49  01/02/20 09:20  01/02/20 09:20  01/02/20 07:49  01/02/20 09:20








                                 Intake & Output











 01/01/20 01/02/20 01/03/20





 06:59 06:59 06:59


 


Intake Total 2547 1280 


 


Output Total 1025  


 


Balance 1522 1280 


 


Weight 63.5 kg 65.2 kg 














Results


Laboratory Results: 


                                        











WBC  8.5 10^3/uL (4.0-10.5)   01/01/20  06:40    


 


RBC  3.35 10^6/uL (3.72-5.28)  L  01/01/20  06:40    


 


Hgb  11.3 g/dL (12.0-15.5)  L  01/01/20  06:40    


 


Hct  32.7 % (36.0-47.0)  L  01/01/20  06:40    


 


MCV  98 fl (80-97)  H  01/01/20  06:40    


 


MCH  33.8 pg (27.0-33.4)  H  01/01/20  06:40    


 


MCHC  34.6 g/dL (32.0-36.0)   01/01/20  06:40    


 


RDW  13.3 % (11.5-14.0)   01/01/20  06:40    


 


Plt Count  200 10^3/uL (150-450)   01/01/20  06:40    


 


Lymph % (Auto)  14.2 % (13-45)   12/29/19  23:56    


 


Mono % (Auto)  4.5 % (3-13)   12/29/19  23:56    


 


Eos % (Auto)  0.2 % (0-6)   12/29/19  23:56    


 


Baso % (Auto)  0.5 % (0-2)   12/29/19  23:56    


 


Absolute Neuts (auto)  8.9 10^3/uL (1.7-8.2)  H  12/29/19  23:56    


 


Absolute Lymphs (auto)  1.6 10^3/uL (0.5-4.7)   12/29/19  23:56    


 


Absolute Monos (auto)  0.5 10^3/uL (0.1-1.4)   12/29/19  23:56    


 


Absolute Eos (auto)  0.0 10^3/uL (0.0-0.6)   12/29/19  23:56    


 


Absolute Basos (auto)  0.1 10^3/uL (0.0-0.2)   12/29/19  23:56    


 


Seg Neutrophils %  80.6 % (42-78)  H  12/29/19  23:56    


 


PT  12.9 SEC (11.4-15.4)   12/29/19  23:56    


 


INR  0.97   12/29/19  23:56    


 


APTT  31.4 SEC (23.5-35.8)   12/29/19  23:56    


 


Sodium  138.0 mmol/L (137-145)   12/31/19  04:00    


 


Potassium  3.9 mmol/L (3.6-5.0)   12/31/19  04:00    


 


Chloride  103 mmol/L ()   12/31/19  04:00    


 


Carbon Dioxide  28 mmol/L (22-30)   12/31/19  04:00    


 


Anion Gap  7  (5-19)   12/31/19  04:00    


 


BUN  19 mg/dL (7-20)   12/31/19  04:00    


 


Creatinine  0.77 mg/dL (0.52-1.25)   12/31/19  04:00    


 


Est GFR ( Amer)  > 60  (>60)   12/31/19  04:00    


 


Est GFR (MDRD) Non-Af  > 60  (>60)   12/31/19  04:00    


 


Glucose  107 mg/dL ()   12/31/19  04:00    


 


Calcium  8.9 mg/dL (8.4-10.2)   12/31/19  04:00    


 


Total Bilirubin  0.6 mg/dL (0.2-1.3)   12/31/19  04:00    


 


Direct Bilirubin  0.1 mg/dL (0.0-0.4)   12/31/19  04:00    


 


Neonat Total Bilirubin  Not Reportable   12/31/19  04:00    


 


Neonat Direct Bilirubin  Not Reportable   12/31/19  04:00    


 


Neonat Indirect Bili  Not Reportable   12/31/19  04:00    


 


AST  26 U/L (14-36)   12/31/19  04:00    


 


ALT  12 U/L (<35)   12/31/19  04:00    


 


Alkaline Phosphatase  71 U/L ()   12/31/19  04:00    


 


Creatine Kinase  220 U/L ()  H  12/29/19  23:56    


 


Troponin I  < 0.012 ng/mL  12/29/19  23:56    


 


Total Protein  5.7 g/dL (6.3-8.2)  L  12/31/19  04:00    


 


Albumin  3.4 g/dL (3.5-5.0)  L  12/31/19  04:00    


 


Urine Color  STRAW   12/30/19  00:12    


 


Urine Appearance  CLEAR   12/30/19  00:12    


 


Urine pH  5.0  (5.0-9.0)   12/30/19  00:12    


 


Ur Specific Gravity  1.006   12/30/19  00:12    


 


Urine Protein  NEGATIVE mg/dL (NEGATIVE)   12/30/19  00:12    


 


Urine Glucose (UA)  50 mg/dL (NEGATIVE)  H  12/30/19  00:12    


 


Urine Ketones  NEGATIVE mg/dL (NEGATIVE)   12/30/19  00:12    


 


Urine Blood  SMALL  (NEGATIVE)  H  12/30/19  00:12    


 


Urine Nitrite  NEGATIVE  (NEGATIVE)   12/30/19  00:12    


 


Urine Bilirubin  NEGATIVE  (NEGATIVE)   12/30/19  00:12    


 


Urine Urobilinogen  NEGATIVE mg/dL (<2.0)   12/30/19  00:12    


 


Ur Leukocyte Esterase  MODERATE  (NEGATIVE)  H  12/30/19  00:12    


 


Urine WBC (Auto)  6 /HPF  12/30/19  00:12    


 


Urine RBC (Auto)  7 /HPF  12/30/19  00:12    


 


Urine Bacteria (Auto)  TRACE /HPF  12/30/19  00:12    


 


Urine Mucus (Auto)  RARE /LPF  12/30/19  00:12    


 


Urine Ascorbic Acid  NEGATIVE  (NEGATIVE)   12/30/19  00:12    


 


Urine Opiates Screen  NEGATIVE   12/30/19  00:12    


 


Urine Methadone Screen  NEGATIVE   12/30/19  00:12    


 


Ur Barbiturates Screen  NEGATIVE   12/30/19  00:12    


 


Ur Phencyclidine Scrn  NEGATIVE   12/30/19  00:12    


 


Ur Amphetamines Screen  NEGATIVE   12/30/19  00:12    


 


U Benzodiazepines Scrn  NEGATIVE   12/30/19  00:12    


 


Urine Cocaine Screen  NEGATIVE   12/30/19  00:12    


 


U Marijuana (THC) Screen  NEGATIVE   12/30/19  00:12    


 


Serum Alcohol  111 mg/dL (NONE DETECTED)   12/29/19  23:56    


 


Blood Type  O POSITIVE   12/29/19  23:56    


 


Antibody Screen  NEGATIVE   12/29/19  23:56    








                                        











  12/29/19





  23:56


 


Troponin I  < 0.012











Impressions: 


                                        





Hip X-Ray  12/29/19 21:23


IMPRESSION:


 


Left subcapital hip fracture as above


 


 


copyright 2011 [employer]- All Rights Reserved


 








Shoulder X-Ray  12/29/19 21:23


IMPRESSION:


 


Inferior dislocation of the humerus with displaced fracture of


the greater tuberosity


 


 


copyright 2011 [employer]- All Rights Reserved


 








Chest X-Ray  12/29/19 23:13


IMPRESSION:


 


No acute cardiopulmonary process


 


 


copyright 2011 [employer]- All Rights Reserved


 








Shoulder X-Ray  12/30/19 01:20


IMPRESSION:


 


1. Interval reduction of the left shoulder dislocation.


2. Mildly displaced fracture through the greater tuberosity.


3. Mild diffuse bone demineralization. 


 








Fluoroscopy  12/31/19 00:00


IMPRESSION:  IMAGE(S) OBTAINED DURING PROCEDURE.


 








Hip X-Ray  12/31/19 00:00


IMPRESSION:  IMAGE(S) OBTAINED DURING PROCEDURE.


 








Pelvis X-Ray  12/31/19 00:00


IMPRESSION:   SATISFACTORY POSTOPERATIVE LEFT HIP.


 














Stroke


Is this a Stroke Patient?: No





Acute Heart Failure





- **


Is this a Heart Failure Patient?: No

## 2020-01-02 NOTE — PDOC PROGRESS REPORT
Subjective


Progress Note for:: 01/02/20


Subjective:: 





Patient is doing well this morning.  No new complaints.  She is out of bed with 

PT but having some difficulty.


Reason For Visit: 


LEFT FEMORAL NECK FRACTURE








Physical Exam


Vital Signs: 


                                        











Temp Pulse Resp BP Pulse Ox


 


 98.7 F   62   16   114/53 L  92 


 


 01/02/20 07:49  01/02/20 09:20  01/02/20 09:20  01/02/20 07:49  01/02/20 09:20








                                 Intake & Output











 01/01/20 01/02/20 01/03/20





 06:59 06:59 06:59


 


Intake Total 2547 1280 


 


Output Total 1025  


 


Balance 1522 1280 


 


Weight 63.5 kg 65.2 kg 











Physical Exam: 





Left lower extremity


-Pulses 2+ distally


-Compartments soft


-Wound clean dry and intact no drainage in appropriate swelling for postop day


-Sensation grossly intact to L3-4-5 S1


-Motor grossly intact to EHL TA gastroc and quad


- Able to perform quad extension and elevate heel off of bed.





LUE


Left upper extremity sensation grossly intact to radial median and ulnar nerve.


Left upper extremity motor function grossly intact to radian median ulnar nerve 

AIN and PIN


Pulses 2+, capillary refill less than 2 seconds


No deformity noted full range of motion of the elbow wrist and fingers without 

pain


Compartments soft, no tenderness to palpation 


skin intact


some tenderness to palpation about the shoulder.  Overall she is tolerating her 

shoulder injury very well with very little pain.





Results


Laboratory Results: 


                                        





                                 01/01/20 06:40 





                                 12/31/19 04:00 





                                        











  12/29/19 12/29/19





  23:56 23:56


 


Creatine Kinase  220 H 


 


Troponin I   < 0.012











Impressions: 


                                        





Chest X-Ray  12/29/19 23:13


IMPRESSION:


 


No acute cardiopulmonary process


 


 


copyright 2011 [employer]- All Rights Reserved


 








Shoulder X-Ray  12/30/19 01:20


IMPRESSION:


 


1. Interval reduction of the left shoulder dislocation.


2. Mildly displaced fracture through the greater tuberosity.


3. Mild diffuse bone demineralization. 


 








Fluoroscopy  12/31/19 00:00


IMPRESSION:  IMAGE(S) OBTAINED DURING PROCEDURE.


 








Hip X-Ray  12/31/19 00:00


IMPRESSION:  IMAGE(S) OBTAINED DURING PROCEDURE.


 








Pelvis X-Ray  12/31/19 00:00


IMPRESSION:   SATISFACTORY POSTOPERATIVE LEFT HIP.


 














Assessment & Plan





- Diagnosis


(1) Hip fracture, left


Qualifiers: 


   Encounter type: initial encounter   Fracture type: closed   Qualified 

Code(s): S72.002A - Fracture of unspecified part of neck of left femur, initial 

encounter for closed fracture   


Is this a current diagnosis for this admission?: Yes   


Plan: 


- weightbearing as tolerated left lower extremity.


-She may leave dressing intact until seen in the office in 7 to 10 days from the

date of surgery


-Aspirin for DVT prophylaxis


-Pain medication prescriptions in the chart


-Encourage out of bed and weightbearing as tolerated with physical therapy


-Discharge to rehab per physical therapy recommendations.








(2) Shoulder fracture, left


Qualifiers: 


   Encounter type: initial encounter   Fracture type: closed   Qualified 

Code(s): S42.92XA - Fracture of left shoulder girdle, part unspecified, initial 

encounter for closed fracture   


Is this a current diagnosis for this admission?: Yes   


Plan: 


-Continue minimal weightbearing left upper extremity with avoidance of active 

abduction.


-May participate with a platform walker to tolerance recommend as little 

weightbearing on the left upper extremity as possible


-Sling for comfort


-We will get follow-up x-rays in the office on her next visit.








- Time


Time Spent with patient: Less than 15 minutes